# Patient Record
Sex: FEMALE | Race: BLACK OR AFRICAN AMERICAN | NOT HISPANIC OR LATINO | Employment: FULL TIME | URBAN - METROPOLITAN AREA
[De-identification: names, ages, dates, MRNs, and addresses within clinical notes are randomized per-mention and may not be internally consistent; named-entity substitution may affect disease eponyms.]

---

## 2024-03-17 ENCOUNTER — HOSPITAL ENCOUNTER (EMERGENCY)
Facility: CLINIC | Age: 24
Discharge: HOME | End: 2024-03-17
Attending: EMERGENCY MEDICINE | Admitting: EMERGENCY MEDICINE
Payer: MEDICAID

## 2024-03-17 VITALS
OXYGEN SATURATION: 98 % | RESPIRATION RATE: 18 BRPM | HEART RATE: 83 BPM | DIASTOLIC BLOOD PRESSURE: 80 MMHG | SYSTOLIC BLOOD PRESSURE: 128 MMHG | TEMPERATURE: 97.1 F

## 2024-03-17 DIAGNOSIS — O21.9 NAUSEA AND VOMITING IN PREGNANCY: ICD-10-CM

## 2024-03-17 LAB
ALBUMIN UR-MCNC: NEGATIVE MG/DL
ANION GAP SERPL CALCULATED.3IONS-SCNC: 15 MMOL/L (ref 7–15)
APPEARANCE UR: CLEAR
BACTERIA #/AREA URNS HPF: ABNORMAL /HPF
BASOPHILS # BLD AUTO: 0.1 10E3/UL (ref 0–0.2)
BASOPHILS NFR BLD AUTO: 1 %
BILIRUB UR QL STRIP: NEGATIVE
BUN SERPL-MCNC: 6.2 MG/DL (ref 6–20)
CALCIUM SERPL-MCNC: 8.9 MG/DL (ref 8.6–10)
CHLORIDE SERPL-SCNC: 100 MMOL/L (ref 98–107)
COLOR UR AUTO: ABNORMAL
CREAT SERPL-MCNC: 0.5 MG/DL (ref 0.51–0.95)
DEPRECATED HCO3 PLAS-SCNC: 18 MMOL/L (ref 22–29)
EGFRCR SERPLBLD CKD-EPI 2021: >90 ML/MIN/1.73M2
EOSINOPHIL # BLD AUTO: 0.1 10E3/UL (ref 0–0.7)
EOSINOPHIL NFR BLD AUTO: 1 %
ERYTHROCYTE [DISTWIDTH] IN BLOOD BY AUTOMATED COUNT: 14.1 % (ref 10–15)
GLUCOSE SERPL-MCNC: 86 MG/DL (ref 70–99)
GLUCOSE UR STRIP-MCNC: NEGATIVE MG/DL
HCG SERPL QL: POSITIVE
HCT VFR BLD AUTO: 39.6 % (ref 35–47)
HGB BLD-MCNC: 12.5 G/DL (ref 11.7–15.7)
HGB UR QL STRIP: NEGATIVE
IMM GRANULOCYTES # BLD: 0 10E3/UL
IMM GRANULOCYTES NFR BLD: 0 %
KETONES UR STRIP-MCNC: NEGATIVE MG/DL
LEUKOCYTE ESTERASE UR QL STRIP: NEGATIVE
LYMPHOCYTES # BLD AUTO: 3.9 10E3/UL (ref 0.8–5.3)
LYMPHOCYTES NFR BLD AUTO: 31 %
MCH RBC QN AUTO: 27.2 PG (ref 26.5–33)
MCHC RBC AUTO-ENTMCNC: 31.6 G/DL (ref 31.5–36.5)
MCV RBC AUTO: 86 FL (ref 78–100)
MONOCYTES # BLD AUTO: 1 10E3/UL (ref 0–1.3)
MONOCYTES NFR BLD AUTO: 8 %
NEUTROPHILS # BLD AUTO: 7.4 10E3/UL (ref 1.6–8.3)
NEUTROPHILS NFR BLD AUTO: 59 %
NITRATE UR QL: NEGATIVE
NRBC # BLD AUTO: 0 10E3/UL
NRBC BLD AUTO-RTO: 0 /100
PH UR STRIP: 5.5 [PH] (ref 5–7)
PLATELET # BLD AUTO: 365 10E3/UL (ref 150–450)
POTASSIUM SERPL-SCNC: 4.5 MMOL/L (ref 3.4–5.3)
RBC # BLD AUTO: 4.59 10E6/UL (ref 3.8–5.2)
RBC URINE: 0 /HPF
SODIUM SERPL-SCNC: 133 MMOL/L (ref 135–145)
SP GR UR STRIP: 1 (ref 1–1.03)
SQUAMOUS EPITHELIAL: <1 /HPF
UROBILINOGEN UR STRIP-MCNC: NORMAL MG/DL
WBC # BLD AUTO: 12.6 10E3/UL (ref 4–11)
WBC URINE: <1 /HPF

## 2024-03-17 PROCEDURE — 81001 URINALYSIS AUTO W/SCOPE: CPT | Performed by: EMERGENCY MEDICINE

## 2024-03-17 PROCEDURE — 84703 CHORIONIC GONADOTROPIN ASSAY: CPT | Performed by: EMERGENCY MEDICINE

## 2024-03-17 PROCEDURE — 85025 COMPLETE CBC W/AUTO DIFF WBC: CPT | Performed by: EMERGENCY MEDICINE

## 2024-03-17 PROCEDURE — 96374 THER/PROPH/DIAG INJ IV PUSH: CPT

## 2024-03-17 PROCEDURE — 258N000003 HC RX IP 258 OP 636: Performed by: EMERGENCY MEDICINE

## 2024-03-17 PROCEDURE — 99284 EMERGENCY DEPT VISIT MOD MDM: CPT | Mod: 25

## 2024-03-17 PROCEDURE — 96361 HYDRATE IV INFUSION ADD-ON: CPT

## 2024-03-17 PROCEDURE — 36415 COLL VENOUS BLD VENIPUNCTURE: CPT | Performed by: EMERGENCY MEDICINE

## 2024-03-17 PROCEDURE — 80048 BASIC METABOLIC PNL TOTAL CA: CPT | Performed by: EMERGENCY MEDICINE

## 2024-03-17 PROCEDURE — 250N000011 HC RX IP 250 OP 636: Performed by: EMERGENCY MEDICINE

## 2024-03-17 RX ORDER — ONDANSETRON 2 MG/ML
4 INJECTION INTRAMUSCULAR; INTRAVENOUS
Status: DISCONTINUED | OUTPATIENT
Start: 2024-03-17 | End: 2024-03-17 | Stop reason: HOSPADM

## 2024-03-17 RX ORDER — ONDANSETRON 4 MG/1
4 TABLET, ORALLY DISINTEGRATING ORAL EVERY 8 HOURS PRN
Qty: 10 TABLET | Refills: 0 | Status: SHIPPED | OUTPATIENT
Start: 2024-03-17 | End: 2024-03-20

## 2024-03-17 RX ADMIN — ONDANSETRON 4 MG: 2 INJECTION INTRAMUSCULAR; INTRAVENOUS at 01:29

## 2024-03-17 RX ADMIN — SODIUM CHLORIDE 1000 ML: 9 INJECTION, SOLUTION INTRAVENOUS at 00:57

## 2024-03-17 ASSESSMENT — ACTIVITIES OF DAILY LIVING (ADL)
ADLS_ACUITY_SCORE: 35
ADLS_ACUITY_SCORE: 35

## 2024-03-17 ASSESSMENT — COLUMBIA-SUICIDE SEVERITY RATING SCALE - C-SSRS
6. HAVE YOU EVER DONE ANYTHING, STARTED TO DO ANYTHING, OR PREPARED TO DO ANYTHING TO END YOUR LIFE?: NO
1. IN THE PAST MONTH, HAVE YOU WISHED YOU WERE DEAD OR WISHED YOU COULD GO TO SLEEP AND NOT WAKE UP?: NO
2. HAVE YOU ACTUALLY HAD ANY THOUGHTS OF KILLING YOURSELF IN THE PAST MONTH?: NO
2. HAVE YOU ACTUALLY HAD ANY THOUGHTS OF KILLING YOURSELF IN THE PAST MONTH?: NO
1. IN THE PAST MONTH, HAVE YOU WISHED YOU WERE DEAD OR WISHED YOU COULD GO TO SLEEP AND NOT WAKE UP?: NO
6. HAVE YOU EVER DONE ANYTHING, STARTED TO DO ANYTHING, OR PREPARED TO DO ANYTHING TO END YOUR LIFE?: NO

## 2024-03-17 NOTE — ED PROVIDER NOTES
History     Chief Complaint:  Vomiting        HPI   Kaden Ibarra is a 23 year old female approximately 5 weeks pregnant by LMP who presents with frequent vomiting.  The patient notes that she is vomiting approximately 2-3 times per day, including once prior to arrival.  She denies any abdominal pain, bleeding, discharge, or other concerns.  She would like her pregnancy status confirmed is also concerned regarding persistent vomiting.        Independent Historian:   none    Review of External Notes: Reviewed 10/9/2023 office visit and family medicine for comprehensive review of medical history    ROS:  Review of Systems    Allergies:  No Known Allergies     Physical Exam     Patient Vitals for the past 24 hrs:   BP Temp Temp src Pulse Resp SpO2   05/12/23 0247 113/73 97.8  F (36.6  C) Temporal 95 20 98 %        Physical Exam  Constitutional: Alert, attentive  HENT:    Nose: Nose normal.    Mouth/Throat: Oropharynx is clear, mucous membranes are moist   Eyes: EOM are normal.   CV: regular rate and rhythm; no murmurs, rubs or gallups  Chest: Effort normal and breath sounds normal.   GI:  There is no tenderness. No distension. Normal bowel sounds  MSK: Normal range of motion.   Neurological: Alert, attentive  Skin: Skin is warm and dry.      Emergency Department Course       Results for orders placed or performed during the hospital encounter of 03/17/24   Basic metabolic panel (BMP)     Status: Abnormal   Result Value Ref Range    Sodium 133 (L) 135 - 145 mmol/L    Potassium 4.5 3.4 - 5.3 mmol/L    Chloride 100 98 - 107 mmol/L    Carbon Dioxide (CO2) 18 (L) 22 - 29 mmol/L    Anion Gap 15 7 - 15 mmol/L    Urea Nitrogen 6.2 6.0 - 20.0 mg/dL    Creatinine 0.50 (L) 0.51 - 0.95 mg/dL    GFR Estimate >90 >60 mL/min/1.73m2    Calcium 8.9 8.6 - 10.0 mg/dL    Glucose 86 70 - 99 mg/dL   HCG QUALitative pregnancy (blood)     Status: Abnormal   Result Value Ref Range    hCG Serum Qualitative Positive (A) Negative   Extra  Tube ([facility] Draw)     Status: None ()    Narrative    The following orders were created for panel order Extra Tube ([facility] Draw).  Procedure                               Abnormality         Status                     ---------                               -----------         ------                     Extra Blue Top Tube[[id number]]                                                         Extra Red Top Tube[[id number]]                                                            Please view results for these tests on the individual orders.   CBC with platelets and differential     Status: Abnormal   Result Value Ref Range    WBC Count 12.6 (H) 4.0 - 11.0 10e3/uL    RBC Count 4.59 3.80 - 5.20 10e6/uL    Hemoglobin 12.5 11.7 - 15.7 g/dL    Hematocrit 39.6 35.0 - 47.0 %    MCV 86 78 - 100 fL    MCH 27.2 26.5 - 33.0 pg    MCHC 31.6 31.5 - 36.5 g/dL    RDW 14.1 10.0 - 15.0 %    Platelet Count 365 150 - 450 10e3/uL    % Neutrophils 59 %    % Lymphocytes 31 %    % Monocytes 8 %    % Eosinophils 1 %    % Basophils 1 %    % Immature Granulocytes 0 %    NRBCs per 100 WBC 0 <1 /100    Absolute Neutrophils 7.4 1.6 - 8.3 10e3/uL    Absolute Lymphocytes 3.9 0.8 - 5.3 10e3/uL    Absolute Monocytes 1.0 0.0 - 1.3 10e3/uL    Absolute Eosinophils 0.1 0.0 - 0.7 10e3/uL    Absolute Basophils 0.1 0.0 - 0.2 10e3/uL    Absolute Immature Granulocytes 0.0 <=0.4 10e3/uL    Absolute NRBCs 0.0 10e3/uL   UA with Microscopic reflex to Culture     Status: Abnormal    Specimen: Urine, Midstream   Result Value Ref Range    Color Urine Straw Colorless, Straw, Light Yellow, Yellow    Appearance Urine Clear Clear    Glucose Urine Negative Negative mg/dL    Bilirubin Urine Negative Negative    Ketones Urine Negative Negative mg/dL    Specific Gravity Urine 1.005 1.003 - 1.035    Blood Urine Negative Negative    pH Urine 5.5 5.0 - 7.0    Protein Albumin Urine Negative Negative mg/dL    Urobilinogen Urine Normal Normal, 2.0 mg/dL    Nitrite Urine Negative  Negative    Leukocyte Esterase Urine Negative Negative    Bacteria Urine Few (A) None Seen /HPF    RBC Urine 0 <=2 /HPF    WBC Urine <1 <=5 /HPF    Squamous Epithelials Urine <1 <=1 /HPF    Narrative    Urine Culture not indicated   CBC + differential     Status: Abnormal    Narrative    The following orders were created for panel order CBC + differential.  Procedure                               Abnormality         Status                     ---------                               -----------         ------                     CBC with platelets and d...[[id number]]  Abnormal            Final result                 Please view results for these tests on the individual orders.       Emergency Department Course & Assessments:             Interventions:  Medications   ondansetron (ZOFRAN) injection 4 mg (4 mg Intravenous $Given 3/17/24 0129)   sodium chloride 0.9% BOLUS 1,000 mL (0 mLs Intravenous Stopped 3/17/24 0221)          Disposition:  The patient was discharged to home.     Impression & Plan        Medical Decision Making:  This is a 23-year-old female who is approximately 5 weeks pregnant by LMP who presents for evaluation of frequent vomiting over the last several weeks.  She is indeed pregnant.  She is not having abdominal pain, bleeding, or discharge to suggest complication of pregnancy or indicated ultrasound at this time.  Vitals are normal and stable.  She is feeling significantly improved after supportive cares and labs are generally reassuring.  She is tolerating p.o.'s without difficulty.  Plan for Zofran prescription for home and establish OB/GYN care.  Return precautions for intractable vomiting, pain, fever, or any other concerns.    Diagnosis:  Visit Diagnosis, Associated Orders, and Comments     ICD-10-CM    1. Nausea and vomiting in pregnancy  O21.9                Mckinley Andino MD  03/17/24 0313

## 2024-10-06 ENCOUNTER — APPOINTMENT (OUTPATIENT)
Dept: ULTRASOUND IMAGING | Facility: CLINIC | Age: 24
End: 2024-10-06
Attending: FAMILY MEDICINE
Payer: COMMERCIAL

## 2024-10-06 ENCOUNTER — HOSPITAL ENCOUNTER (OUTPATIENT)
Facility: CLINIC | Age: 24
End: 2024-10-06
Admitting: FAMILY MEDICINE
Payer: COMMERCIAL

## 2024-10-06 ENCOUNTER — HOSPITAL ENCOUNTER (OUTPATIENT)
Facility: CLINIC | Age: 24
Discharge: HOME | End: 2024-10-06
Attending: OBSTETRICS & GYNECOLOGY | Admitting: FAMILY MEDICINE
Payer: COMMERCIAL

## 2024-10-06 VITALS
SYSTOLIC BLOOD PRESSURE: 108 MMHG | BODY MASS INDEX: 26.82 KG/M2 | HEIGHT: 65 IN | RESPIRATION RATE: 16 BRPM | WEIGHT: 161 LBS | TEMPERATURE: 97.7 F | DIASTOLIC BLOOD PRESSURE: 69 MMHG

## 2024-10-06 PROBLEM — Z36.89 ENCOUNTER FOR TRIAGE IN PREGNANT PATIENT: Status: ACTIVE | Noted: 2024-10-06

## 2024-10-06 PROCEDURE — 59025 FETAL NON-STRESS TEST: CPT

## 2024-10-06 PROCEDURE — G0463 HOSPITAL OUTPT CLINIC VISIT: HCPCS

## 2024-10-06 PROCEDURE — 76819 FETAL BIOPHYS PROFIL W/O NST: CPT

## 2024-10-06 PROCEDURE — 59025 FETAL NON-STRESS TEST: CPT | Mod: 26 | Performed by: OBSTETRICS & GYNECOLOGY

## 2024-10-06 RX ORDER — LIDOCAINE 40 MG/G
CREAM TOPICAL
Status: DISCONTINUED | OUTPATIENT
Start: 2024-10-06 | End: 2024-10-06 | Stop reason: HOSPADM

## 2024-10-06 RX ORDER — VITAMIN A, VITAMIN C, VITAMIN D-3, VITAMIN E, VITAMIN B-1, VITAMIN B-2, NIACIN, VITAMIN B-6, CALCIUM, IRON, ZINC, COPPER 4000; 120; 400; 22; 1.84; 3; 20; 10; 1; 12; 200; 27; 25; 2 [IU]/1; MG/1; [IU]/1; MG/1; MG/1; MG/1; MG/1; MG/1; MG/1; UG/1; MG/1; MG/1; MG/1; MG/1
1 TABLET ORAL DAILY
COMMUNITY

## 2024-10-06 ASSESSMENT — ACTIVITIES OF DAILY LIVING (ADL)
ADLS_ACUITY_SCORE: 20
ADLS_ACUITY_SCORE: 35

## 2024-10-07 NOTE — DISCHARGE INSTRUCTIONS
Your US report shows a BPP 8/8 and your fetal heart tracing was reactive.   Learning About When to Call Your Doctor During Pregnancy (After 20 Weeks)  Overview  It's common to have concerns about what might be a problem when you're pregnant. Most pregnancies don't have any serious problems. But it's still important to know when to call your doctor if you have certain symptoms or signs of labor.  These are general suggestions. Your doctor may give you some more information about when to call.  When to call your doctor (after 20 weeks)  Call 911  anytime you think you may need emergency care. For example, call if:  You have severe vaginal bleeding. This means you are soaking through a pad each hour for 2 or more hours.  You have sudden, severe pain in your belly.  You have chest pain, are short of breath, or cough up blood.  You passed out (lost consciousness).  You have a seizure.  You see or feel the umbilical cord.  You think you are about to deliver your baby and can't make it safely to the hospital or birthing center.  Call your doctor now or seek immediate medical care if:  You have vaginal bleeding.  You have belly pain.  You have a fever.  You are dizzy or lightheaded, or you feel like you may faint.  You have signs of a blood clot in your leg (called a deep vein thrombosis), such as:  Pain in the calf, back of the knee, thigh, or groin.  Swelling in your leg or groin.  A color change on the leg or groin. The skin may be reddish or purplish, depending on your usual skin color.  You have symptoms of preeclampsia, such as:  Sudden swelling of your face, hands, or feet.  New vision problems (such as dimness, blurring, or seeing spots).  A severe headache.  You have a sudden release of fluid from your vagina. (You think your water broke.)  You've been having regular contractions for an hour. This means that you've had at least 6 contractions within 1 hour, even after you change your position and drink fluids.  You  "notice that your baby has stopped moving or is moving less than normal.  You have signs of heart failure, such as:  New or increased shortness of breath.  New or worse swelling in your legs, ankles, or feet.  Sudden weight gain, such as more than 2 to 3 pounds in a day or 5 pounds in a week.  Feeling so tired or weak that you cannot do your usual activities.  You have symptoms of a urinary tract infection. These may include:  Pain or burning when you urinate.  A frequent need to urinate without being able to pass much urine.  Pain in the flank, which is just below the rib cage and above the waist on either side of the back.  Blood in your urine.  Watch closely for changes in your health, and be sure to contact your doctor if:  You have vaginal discharge that smells bad.  You feel sad, anxious, or hopeless for more than a few days.  You have skin changes, such as a rash, itching, or a yellow color to your skin.  You have other concerns about your pregnancy.  If you have labor signs at 37 weeks or more  If you have signs of labor at 37 weeks or more, your doctor may tell you to call when your labor becomes more active. Symptoms of active labor include:  Contractions that are regular.  Contractions that are less than 5 minutes apart.  Contractions that are hard to talk through.  Follow-up care is a key part of your treatment and safety. Be sure to make and go to all appointments, and call your doctor if you are having problems. It's also a good idea to know your test results and keep a list of the medicines you take.  Where can you learn more?  Go to https://www.[employer].net/patiented  Enter N531 in the search box to learn more about \"Learning About When to Call Your Doctor During Pregnancy (After 20 Weeks).\"  Current as of: July 10, 2023  Content Version: 14.2 2024 [employer] [facility] [employer].   Care instructions adapted under license by your healthcare professional. If you have questions about a medical condition or " this instruction, always ask your healthcare professional. [employer] disclaims any warranty or liability for your use of this information.

## 2024-10-07 NOTE — PROVIDER NOTIFICATION
Communication with Dr. Montero via phone regarding DFM for doctor of the week provider group as patient is currently an established [facility] patient. Reported patient is a 24 yo,  at 34 weeks and 2 days GA with decreased fetal movement that began yesterday with minimal kick counts and continued today. Pregnancy complicated by anemia, low weight gain, heartburn and cleft lip. She is c/o mild dizziness, similar with her baseline, had a light meal earlier. On FHT which is reactive. [place] tracing occasional uterine irritability, patient denies.     TORB for NST, BPP, PO hydrate.

## 2024-10-07 NOTE — PLAN OF CARE
Patient's After Visit Summary was reviewed with patient.  Patient verbalized understanding of After Visit Summary, recommended follow up and was given an opportunity to ask questions.   Discharge medications sent home with patient/family: Not applicable   Discharged home independently.

## 2024-10-07 NOTE — PLAN OF CARE
Data: [facility] presented [facility]: 10/6/2024  6:49 PM.  Reason for maternal/fetal assessment is decreased fetal movement. Patient reports less fetal movement that started yesterday for long periods of time.  Patient is a .  Prenatal record reviewed. Pregnancy has been uncomplicated..  Gestational Age 34w2d. VSS. Fetal movement decreased since 10/05/24. Patient denies uterine contractions, leaking of vaginal fluid/rupture of membranes, vaginal bleeding, abdominal pain, pelvic pressure, nausea, vomiting, headache, visual disturbances, epigastric or URQ pain, significant edema. Support person is not present.   Action: Verbal consent for EFM. Triage assessment completed. Bill of rights reviewed.  Response: Patient verbalized agreement with plan. Will contact Dr Brianna Hyman with update and for further orders.

## 2024-10-07 NOTE — PROVIDER NOTIFICATION
Communication with Dr. Montero via vocera messaging. Reviewed US tech report on BPP and reactive NST. TORB to discharge home.

## 2024-10-29 ENCOUNTER — HOSPITAL ENCOUNTER (OUTPATIENT)
Facility: CLINIC | Age: 24
Discharge: HOME | End: 2024-10-29
Attending: OBSTETRICS & GYNECOLOGY | Admitting: OBSTETRICS & GYNECOLOGY
Payer: COMMERCIAL

## 2024-10-29 VITALS — RESPIRATION RATE: 18 BRPM | SYSTOLIC BLOOD PRESSURE: 114 MMHG | DIASTOLIC BLOOD PRESSURE: 74 MMHG | TEMPERATURE: 97.7 F

## 2024-10-29 PROCEDURE — G0463 HOSPITAL OUTPT CLINIC VISIT: HCPCS

## 2024-10-29 ASSESSMENT — ACTIVITIES OF DAILY LIVING (ADL)
ADLS_ACUITY_SCORE: 0

## 2024-10-29 NOTE — CARE PLAN
Data: Patient presented to [facility]: 10/29/2024  5:32 PM.  Reason for maternal/fetal assessment is uterine contractions, abdominal pain. Patient reports abdominal pain in her entire abdomen that radiates intermittently to her back. Pt also states she feels pelvic pressure. She is rating the pain 6/10 when it comes. Patient is a .  Prenatal record reviewed. Pregnancy has been uncomplicated..  Gestational Age 37w4d. VSS. Fetal movement active. Patient denies leaking of vaginal fluid/rupture of membranes, vaginal bleeding, nausea, vomiting, headache, visual disturbances, epigastric or URQ pain, significant edema. Support person is not present.   Action: Verbal consent for EFM. Triage assessment completed. Bill of rights reviewed.  Response: Patient verbalized agreement with plan. Will contact Dr Mariama Abebe with update and for further orders.

## 2024-10-29 NOTE — PROVIDER NOTIFICATION
10/29/24 1800   Provider Notification   Provider Name/Title Dr. Issa   Method of Notification In Department   Notification Reason Patient Arrived     Updated Dr. Issa in department on arrival of pt  at 37w4d here with c/o intermittent abdominal pain that radiates to her back bilaterally. Pt states she feels the pain every 2 minutes or so, rating a 6/10. Fetus active per pt. Denies vaginal bldg, LOF, or any other symptoms at this time.    On monitor, FHT's cat I, reactive with moderate variability and accelerations present. On [place], ctx's q2-5 minutes, palpating mild.     MD orders to check pt's cervix and update. No need to send UA at this time. POC reviewed with pt--all questions answered. Education, emotional support, and anticipatory guidance provided.

## 2024-10-29 NOTE — PROVIDER NOTIFICATION
10/29/24 1830   Provider Notification   Provider Name/Title Dr. Issa   Method of Notification In Department     Updated Dr. Issa on SVE 1/70/-3, mid and soft. Feels cephalic on exam. Ctx q2-5.5 minutes, continuing to palpate mild at this time.    Plan to recheck SVE in 1.5-2 hours. Pt updated and agreeable with plan going forward. Questions answered.

## 2024-10-30 NOTE — PLAN OF CARE
Data: Patient assessed in the [facility] for uterine contractions.  Cervical exam 1/70/-3.  Membranes intact.  Contractions occ.  Action:  Presumed adequate fetal oxygenation documented (see flow record). Discharge instructions reviewed.  Patient instructed to report change in fetal movement, vaginal leaking of fluid or bleeding, abdominal pain, or any concerns related to the pregnancy to her nurse/physician.    Response: Orders to discharge home per Mariama Abebe.  Patient verbalized understanding of education and verbalized agreement with plan. Discharged to home at 2002.

## 2024-10-30 NOTE — PROVIDER NOTIFICATION
10/29/24 1943   Provider Notification   Provider Name/Title Dr. Issa   Method of Notification Electronic Page     Kaden is unchanged at 1/70/-3. Since 7pm, x2 contractions noted on toco but patient reporting she is feeling them the same as when she got here. FHR moderate with accels an no decels.     Orders to discharge home with labor precautions and to follow up with her scheduled appointment on Friday.

## 2024-10-30 NOTE — DISCHARGE INSTRUCTIONS
Learning About When to Call Your Doctor During Pregnancy (After 20 Weeks)  Overview  It's common to have concerns about what might be a problem when you're pregnant. Most pregnancies don't have any serious problems. But it's still important to know when to call your doctor if you have certain symptoms or signs of labor.  These are general suggestions. Your doctor may give you some more information about when to call.  When to call your doctor (after 20 weeks)  Call 911  anytime you think you may need emergency care. For example, call if:  You have severe vaginal bleeding. This means you are soaking through a pad each hour for 2 or more hours.  You have sudden, severe pain in your belly.  You have chest pain, are short of breath, or cough up blood.  You passed out (lost consciousness).  You have a seizure.  You see or feel the umbilical cord.  You think you are about to deliver your baby and can't make it safely to the hospital or birthing center.  Call your doctor now or seek immediate medical care if:  You have vaginal bleeding.  You have belly pain.  You have a fever.  You are dizzy or lightheaded, or you feel like you may faint.  You have signs of a blood clot in your leg (called a deep vein thrombosis), such as:  Pain in the calf, back of the knee, thigh, or groin.  Swelling in your leg or groin.  A color change on the leg or groin. The skin may be reddish or purplish, depending on your usual skin color.  You have symptoms of preeclampsia, such as:  Sudden swelling of your face, hands, or feet.  New vision problems (such as dimness, blurring, or seeing spots).  A severe headache.  You have a sudden release of fluid from your vagina. (You think your water broke.)  You've been having regular contractions for an hour. This means that you've had at least 6 contractions within 1 hour, even after you change your position and drink fluids.  You notice that your baby has stopped moving or is moving less than  "normal.  You have signs of heart failure, such as:  New or increased shortness of breath.  New or worse swelling in your legs, ankles, or feet.  Sudden weight gain, such as more than 2 to 3 pounds in a day or 5 pounds in a week.  Feeling so tired or weak that you cannot do your usual activities.  You have symptoms of a urinary tract infection. These may include:  Pain or burning when you urinate.  A frequent need to urinate without being able to pass much urine.  Pain in the flank, which is just below the rib cage and above the waist on either side of the back.  Blood in your urine.  Watch closely for changes in your health, and be sure to contact your doctor if:  You have vaginal discharge that smells bad.  You feel sad, anxious, or hopeless for more than a few days.  You have skin changes, such as a rash, itching, or a yellow color to your skin.  You have other concerns about your pregnancy.  If you have labor signs at 37 weeks or more  If you have signs of labor at 37 weeks or more, your doctor may tell you to call when your labor becomes more active. Symptoms of active labor include:  Contractions that are regular.  Contractions that are less than 5 minutes apart.  Contractions that are hard to talk through.  Follow-up care is a key part of your treatment and safety. Be sure to make and go to all appointments, and call your doctor if you are having problems. It's also a good idea to know your test results and keep a list of the medicines you take.  Where can you learn more?  Go to https://www.[email or web address].net/patiented  Enter N531 in the search box to learn more about \"Learning About When to Call Your Doctor During Pregnancy (After 20 Weeks).\"  Current as of: July 10, 2023  Content Version: 14.2 2024 [facility] [employer].   Care instructions adapted under license by your healthcare professional. If you have questions about a medical condition or this instruction, always ask your healthcare professional. " [employer] disclaims any warranty or liability for your use of this information.

## 2024-11-11 ENCOUNTER — ANESTHESIA (OUTPATIENT)
Dept: OBGYN | Facility: CLINIC | Age: 24
End: 2024-11-11
Payer: COMMERCIAL

## 2024-11-11 ENCOUNTER — ANESTHESIA EVENT (OUTPATIENT)
Dept: OBGYN | Facility: CLINIC | Age: 24
End: 2024-11-11
Payer: COMMERCIAL

## 2024-11-11 ENCOUNTER — HOSPITAL ENCOUNTER (INPATIENT)
Facility: CLINIC | Age: 24
LOS: 2 days | Discharge: HOME | End: 2024-11-13
Attending: OBSTETRICS & GYNECOLOGY | Admitting: OBSTETRICS & GYNECOLOGY
Payer: COMMERCIAL

## 2024-11-11 LAB
ABO/RH(D): NORMAL
ANTIBODY SCREEN: NEGATIVE
HGB BLD-MCNC: 14.3 G/DL (ref 11.7–15.7)
SPECIMEN EXPIRATION DATE: NORMAL
T PALLIDUM AB SER QL: NONREACTIVE

## 2024-11-11 PROCEDURE — 120N000013 HC R&B IMCU

## 2024-11-11 PROCEDURE — 250N000009 HC RX 250: Performed by: OBSTETRICS & GYNECOLOGY

## 2024-11-11 PROCEDURE — 00HU33Z INSERTION OF INFUSION DEVICE INTO SPINAL CANAL, PERCUTANEOUS APPROACH: ICD-10-PCS | Performed by: ANESTHESIOLOGY

## 2024-11-11 PROCEDURE — 250N000011 HC RX IP 250 OP 636: Performed by: OBSTETRICS & GYNECOLOGY

## 2024-11-11 PROCEDURE — 0KQM0ZZ REPAIR PERINEUM MUSCLE, OPEN APPROACH: ICD-10-PCS | Performed by: OBSTETRICS & GYNECOLOGY

## 2024-11-11 PROCEDURE — 86780 TREPONEMA PALLIDUM: CPT | Performed by: OBSTETRICS & GYNECOLOGY

## 2024-11-11 PROCEDURE — 86900 BLOOD TYPING SEROLOGIC ABO: CPT | Performed by: OBSTETRICS & GYNECOLOGY

## 2024-11-11 PROCEDURE — 10907ZC DRAINAGE OF AMNIOTIC FLUID, THERAPEUTIC FROM PRODUCTS OF CONCEPTION, VIA NATURAL OR ARTIFICIAL OPENING: ICD-10-PCS | Performed by: OBSTETRICS & GYNECOLOGY

## 2024-11-11 PROCEDURE — 3E0R3BZ INTRODUCTION OF ANESTHETIC AGENT INTO SPINAL CANAL, PERCUTANEOUS APPROACH: ICD-10-PCS | Performed by: ANESTHESIOLOGY

## 2024-11-11 PROCEDURE — 250N000013 HC RX MED GY IP 250 OP 250 PS 637: Performed by: OBSTETRICS & GYNECOLOGY

## 2024-11-11 PROCEDURE — 722N000001 HC LABOR CARE VAGINAL DELIVERY SINGLE

## 2024-11-11 PROCEDURE — 85018 HEMOGLOBIN: CPT | Performed by: OBSTETRICS & GYNECOLOGY

## 2024-11-11 PROCEDURE — 258N000003 HC RX IP 258 OP 636: Performed by: ANESTHESIOLOGY

## 2024-11-11 PROCEDURE — 86850 RBC ANTIBODY SCREEN: CPT | Performed by: OBSTETRICS & GYNECOLOGY

## 2024-11-11 PROCEDURE — 370N000003 HC ANESTHESIA WARD SERVICE: Performed by: ANESTHESIOLOGY

## 2024-11-11 PROCEDURE — 258N000003 HC RX IP 258 OP 636: Performed by: OBSTETRICS & GYNECOLOGY

## 2024-11-11 RX ORDER — OXYTOCIN 10 [USP'U]/ML
10 INJECTION, SOLUTION INTRAMUSCULAR; INTRAVENOUS
Status: DISCONTINUED | OUTPATIENT
Start: 2024-11-11 | End: 2024-11-13 | Stop reason: HOSPADM

## 2024-11-11 RX ORDER — NALOXONE HYDROCHLORIDE 0.4 MG/ML
0.4 INJECTION, SOLUTION INTRAMUSCULAR; INTRAVENOUS; SUBCUTANEOUS
Status: DISCONTINUED | OUTPATIENT
Start: 2024-11-11 | End: 2024-11-11 | Stop reason: HOSPADM

## 2024-11-11 RX ORDER — METOCLOPRAMIDE HYDROCHLORIDE 5 MG/ML
10 INJECTION INTRAMUSCULAR; INTRAVENOUS EVERY 6 HOURS PRN
Status: DISCONTINUED | OUTPATIENT
Start: 2024-11-11 | End: 2024-11-11 | Stop reason: HOSPADM

## 2024-11-11 RX ORDER — SODIUM CHLORIDE, SODIUM LACTATE, POTASSIUM CHLORIDE, CALCIUM CHLORIDE 600; 310; 30; 20 MG/100ML; MG/100ML; MG/100ML; MG/100ML
INJECTION, SOLUTION INTRAVENOUS CONTINUOUS
Status: DISCONTINUED | OUTPATIENT
Start: 2024-11-11 | End: 2024-11-11 | Stop reason: HOSPADM

## 2024-11-11 RX ORDER — FENTANYL CITRATE-0.9 % NACL/PF 10 MCG/ML
100 PLASTIC BAG, INJECTION (ML) INTRAVENOUS EVERY 5 MIN PRN
Status: DISCONTINUED | OUTPATIENT
Start: 2024-11-11 | End: 2024-11-11 | Stop reason: HOSPADM

## 2024-11-11 RX ORDER — ONDANSETRON 2 MG/ML
4 INJECTION INTRAMUSCULAR; INTRAVENOUS EVERY 6 HOURS PRN
Status: DISCONTINUED | OUTPATIENT
Start: 2024-11-11 | End: 2024-11-11 | Stop reason: HOSPADM

## 2024-11-11 RX ORDER — PENICILLIN G 3000000 [IU]/50ML
3 INJECTION, SOLUTION INTRAVENOUS EVERY 4 HOURS
Status: DISCONTINUED | OUTPATIENT
Start: 2024-11-11 | End: 2024-11-11 | Stop reason: HOSPADM

## 2024-11-11 RX ORDER — MISOPROSTOL 200 UG/1
800 TABLET ORAL
Status: DISCONTINUED | OUTPATIENT
Start: 2024-11-11 | End: 2024-11-13 | Stop reason: HOSPADM

## 2024-11-11 RX ORDER — ACETAMINOPHEN 325 MG/1
650 TABLET ORAL EVERY 4 HOURS PRN
Status: DISCONTINUED | OUTPATIENT
Start: 2024-11-11 | End: 2024-11-13 | Stop reason: HOSPADM

## 2024-11-11 RX ORDER — BISACODYL 10 MG
10 SUPPOSITORY, RECTAL RECTAL DAILY PRN
Status: DISCONTINUED | OUTPATIENT
Start: 2024-11-11 | End: 2024-11-13 | Stop reason: HOSPADM

## 2024-11-11 RX ORDER — MISOPROSTOL 200 UG/1
400 TABLET ORAL
Status: DISCONTINUED | OUTPATIENT
Start: 2024-11-11 | End: 2024-11-11 | Stop reason: HOSPADM

## 2024-11-11 RX ORDER — PROCHLORPERAZINE MALEATE 10 MG
10 TABLET ORAL EVERY 6 HOURS PRN
Status: DISCONTINUED | OUTPATIENT
Start: 2024-11-11 | End: 2024-11-11 | Stop reason: HOSPADM

## 2024-11-11 RX ORDER — PENICILLIN G POTASSIUM 5000000 [IU]/1
5 INJECTION, POWDER, FOR SOLUTION INTRAMUSCULAR; INTRAVENOUS ONCE
Status: COMPLETED | OUTPATIENT
Start: 2024-11-11 | End: 2024-11-11

## 2024-11-11 RX ORDER — NALOXONE HYDROCHLORIDE 0.4 MG/ML
0.2 INJECTION, SOLUTION INTRAMUSCULAR; INTRAVENOUS; SUBCUTANEOUS
Status: DISCONTINUED | OUTPATIENT
Start: 2024-11-11 | End: 2024-11-11 | Stop reason: HOSPADM

## 2024-11-11 RX ORDER — TRANEXAMIC ACID 10 MG/ML
1 INJECTION, SOLUTION INTRAVENOUS EVERY 30 MIN PRN
Status: DISCONTINUED | OUTPATIENT
Start: 2024-11-11 | End: 2024-11-11 | Stop reason: HOSPADM

## 2024-11-11 RX ORDER — OXYTOCIN/0.9 % SODIUM CHLORIDE 30/500 ML
340 PLASTIC BAG, INJECTION (ML) INTRAVENOUS CONTINUOUS PRN
Status: DISCONTINUED | OUTPATIENT
Start: 2024-11-11 | End: 2024-11-13 | Stop reason: HOSPADM

## 2024-11-11 RX ORDER — CITRIC ACID/SODIUM CITRATE 334-500MG
30 SOLUTION, ORAL ORAL
Status: DISCONTINUED | OUTPATIENT
Start: 2024-11-11 | End: 2024-11-11 | Stop reason: HOSPADM

## 2024-11-11 RX ORDER — LOPERAMIDE HYDROCHLORIDE 2 MG/1
2 CAPSULE ORAL
Status: DISCONTINUED | OUTPATIENT
Start: 2024-11-11 | End: 2024-11-13 | Stop reason: HOSPADM

## 2024-11-11 RX ORDER — FENTANYL CITRATE 50 UG/ML
100 INJECTION, SOLUTION INTRAMUSCULAR; INTRAVENOUS
Status: DISCONTINUED | OUTPATIENT
Start: 2024-11-11 | End: 2024-11-11 | Stop reason: HOSPADM

## 2024-11-11 RX ORDER — MISOPROSTOL 200 UG/1
400 TABLET ORAL
Status: DISCONTINUED | OUTPATIENT
Start: 2024-11-11 | End: 2024-11-13 | Stop reason: HOSPADM

## 2024-11-11 RX ORDER — OXYTOCIN/0.9 % SODIUM CHLORIDE 30/500 ML
340 PLASTIC BAG, INJECTION (ML) INTRAVENOUS CONTINUOUS PRN
Status: DISCONTINUED | OUTPATIENT
Start: 2024-11-11 | End: 2024-11-11 | Stop reason: HOSPADM

## 2024-11-11 RX ORDER — CALCIUM CARBONATE 500 MG/1
1000 TABLET, CHEWABLE ORAL 2 TIMES DAILY PRN
Status: DISCONTINUED | OUTPATIENT
Start: 2024-11-11 | End: 2024-11-12

## 2024-11-11 RX ORDER — METHYLERGONOVINE MALEATE 0.2 MG/ML
200 INJECTION INTRAVENOUS
Status: DISCONTINUED | OUTPATIENT
Start: 2024-11-11 | End: 2024-11-11 | Stop reason: HOSPADM

## 2024-11-11 RX ORDER — OXYTOCIN/0.9 % SODIUM CHLORIDE 30/500 ML
100-340 PLASTIC BAG, INJECTION (ML) INTRAVENOUS CONTINUOUS PRN
Status: DISCONTINUED | OUTPATIENT
Start: 2024-11-11 | End: 2024-11-12

## 2024-11-11 RX ORDER — OXYTOCIN 10 [USP'U]/ML
10 INJECTION, SOLUTION INTRAMUSCULAR; INTRAVENOUS
Status: DISCONTINUED | OUTPATIENT
Start: 2024-11-11 | End: 2024-11-12

## 2024-11-11 RX ORDER — LOPERAMIDE HYDROCHLORIDE 2 MG/1
4 CAPSULE ORAL
Status: DISCONTINUED | OUTPATIENT
Start: 2024-11-11 | End: 2024-11-11 | Stop reason: HOSPADM

## 2024-11-11 RX ORDER — FENTANYL/BUPIVACAINE/NS/PF 2-1250MCG
PLASTIC BAG, INJECTION (ML) INJECTION
Status: COMPLETED
Start: 2024-11-11 | End: 2024-11-11

## 2024-11-11 RX ORDER — KETOROLAC TROMETHAMINE 30 MG/ML
30 INJECTION, SOLUTION INTRAMUSCULAR; INTRAVENOUS
Status: COMPLETED | OUTPATIENT
Start: 2024-11-11 | End: 2024-11-11

## 2024-11-11 RX ORDER — IBUPROFEN 800 MG/1
800 TABLET, FILM COATED ORAL EVERY 6 HOURS PRN
Status: DISCONTINUED | OUTPATIENT
Start: 2024-11-11 | End: 2024-11-13 | Stop reason: HOSPADM

## 2024-11-11 RX ORDER — OXYTOCIN 10 [USP'U]/ML
10 INJECTION, SOLUTION INTRAMUSCULAR; INTRAVENOUS
Status: DISCONTINUED | OUTPATIENT
Start: 2024-11-11 | End: 2024-11-11 | Stop reason: HOSPADM

## 2024-11-11 RX ORDER — CARBOPROST TROMETHAMINE 250 UG/ML
250 INJECTION, SOLUTION INTRAMUSCULAR
Status: DISCONTINUED | OUTPATIENT
Start: 2024-11-11 | End: 2024-11-13 | Stop reason: HOSPADM

## 2024-11-11 RX ORDER — IBUPROFEN 800 MG/1
800 TABLET, FILM COATED ORAL
Status: COMPLETED | OUTPATIENT
Start: 2024-11-11 | End: 2024-11-11

## 2024-11-11 RX ORDER — LOPERAMIDE HYDROCHLORIDE 2 MG/1
2 CAPSULE ORAL
Status: DISCONTINUED | OUTPATIENT
Start: 2024-11-11 | End: 2024-11-11 | Stop reason: HOSPADM

## 2024-11-11 RX ORDER — METHYLERGONOVINE MALEATE 0.2 MG/ML
200 INJECTION INTRAVENOUS
Status: DISCONTINUED | OUTPATIENT
Start: 2024-11-11 | End: 2024-11-13 | Stop reason: HOSPADM

## 2024-11-11 RX ORDER — HYDROCORTISONE 25 MG/G
CREAM TOPICAL 3 TIMES DAILY PRN
Status: DISCONTINUED | OUTPATIENT
Start: 2024-11-11 | End: 2024-11-13 | Stop reason: HOSPADM

## 2024-11-11 RX ORDER — MISOPROSTOL 200 UG/1
800 TABLET ORAL
Status: DISCONTINUED | OUTPATIENT
Start: 2024-11-11 | End: 2024-11-11 | Stop reason: HOSPADM

## 2024-11-11 RX ORDER — LOPERAMIDE HYDROCHLORIDE 2 MG/1
4 CAPSULE ORAL
Status: DISCONTINUED | OUTPATIENT
Start: 2024-11-11 | End: 2024-11-13 | Stop reason: HOSPADM

## 2024-11-11 RX ORDER — FENTANYL CITRATE 50 UG/ML
100 INJECTION, SOLUTION INTRAMUSCULAR; INTRAVENOUS ONCE
Status: COMPLETED | OUTPATIENT
Start: 2024-11-11 | End: 2024-11-11

## 2024-11-11 RX ORDER — CARBOPROST TROMETHAMINE 250 UG/ML
250 INJECTION, SOLUTION INTRAMUSCULAR
Status: DISCONTINUED | OUTPATIENT
Start: 2024-11-11 | End: 2024-11-11 | Stop reason: HOSPADM

## 2024-11-11 RX ORDER — NALBUPHINE HYDROCHLORIDE 10 MG/ML
2.5-5 INJECTION INTRAMUSCULAR; INTRAVENOUS; SUBCUTANEOUS EVERY 6 HOURS PRN
Status: DISCONTINUED | OUTPATIENT
Start: 2024-11-11 | End: 2024-11-12

## 2024-11-11 RX ORDER — ONDANSETRON 4 MG/1
4 TABLET, ORALLY DISINTEGRATING ORAL EVERY 6 HOURS PRN
Status: DISCONTINUED | OUTPATIENT
Start: 2024-11-11 | End: 2024-11-11 | Stop reason: HOSPADM

## 2024-11-11 RX ORDER — METOCLOPRAMIDE 10 MG/1
10 TABLET ORAL EVERY 6 HOURS PRN
Status: DISCONTINUED | OUTPATIENT
Start: 2024-11-11 | End: 2024-11-11 | Stop reason: HOSPADM

## 2024-11-11 RX ORDER — SODIUM CHLORIDE, SODIUM LACTATE, POTASSIUM CHLORIDE, CALCIUM CHLORIDE 600; 310; 30; 20 MG/100ML; MG/100ML; MG/100ML; MG/100ML
INJECTION, SOLUTION INTRAVENOUS CONTINUOUS
Status: DISCONTINUED | OUTPATIENT
Start: 2024-11-11 | End: 2024-11-12

## 2024-11-11 RX ORDER — TRANEXAMIC ACID 10 MG/ML
1 INJECTION, SOLUTION INTRAVENOUS EVERY 30 MIN PRN
Status: DISCONTINUED | OUTPATIENT
Start: 2024-11-11 | End: 2024-11-13 | Stop reason: HOSPADM

## 2024-11-11 RX ORDER — MODIFIED LANOLIN
OINTMENT (GRAM) TOPICAL
Status: DISCONTINUED | OUTPATIENT
Start: 2024-11-11 | End: 2024-11-13 | Stop reason: HOSPADM

## 2024-11-11 RX ORDER — DOCUSATE SODIUM 100 MG/1
100 CAPSULE, LIQUID FILLED ORAL DAILY
Status: DISCONTINUED | OUTPATIENT
Start: 2024-11-12 | End: 2024-11-13 | Stop reason: HOSPADM

## 2024-11-11 RX ADMIN — FENTANYL CITRATE 100 MCG: 50 INJECTION, SOLUTION INTRAMUSCULAR; INTRAVENOUS at 16:51

## 2024-11-11 RX ADMIN — Medication 340 ML/HR: at 20:49

## 2024-11-11 RX ADMIN — SODIUM CHLORIDE, POTASSIUM CHLORIDE, SODIUM LACTATE AND CALCIUM CHLORIDE 500 ML: 600; 310; 30; 20 INJECTION, SOLUTION INTRAVENOUS at 13:50

## 2024-11-11 RX ADMIN — Medication: at 15:56

## 2024-11-11 RX ADMIN — ACETAMINOPHEN 325MG 650 MG: 325 TABLET ORAL at 22:19

## 2024-11-11 RX ADMIN — PENICILLIN G POTASSIUM 5 MILLION UNITS: 5000000 POWDER, FOR SOLUTION INTRAMUSCULAR; INTRAPLEURAL; INTRATHECAL; INTRAVENOUS at 14:28

## 2024-11-11 RX ADMIN — PENICILLIN G 3 MILLION UNITS: 3000000 INJECTION, SOLUTION INTRAVENOUS at 18:35

## 2024-11-11 RX ADMIN — MISOPROSTOL 800 MCG: 200 TABLET ORAL at 21:10

## 2024-11-11 RX ADMIN — SODIUM CHLORIDE, POTASSIUM CHLORIDE, SODIUM LACTATE AND CALCIUM CHLORIDE 500 ML: 600; 310; 30; 20 INJECTION, SOLUTION INTRAVENOUS at 15:17

## 2024-11-11 RX ADMIN — KETOROLAC TROMETHAMINE 30 MG: 30 INJECTION, SOLUTION INTRAMUSCULAR at 20:51

## 2024-11-11 RX ADMIN — FENTANYL CITRATE 100 MCG: 50 INJECTION, SOLUTION INTRAMUSCULAR; INTRAVENOUS at 13:53

## 2024-11-11 RX ADMIN — CALCIUM CARBONATE (ANTACID) CHEW TAB 500 MG 1000 MG: 500 CHEW TAB at 18:55

## 2024-11-11 ASSESSMENT — ACTIVITIES OF DAILY LIVING (ADL)
ADLS_ACUITY_SCORE: 0

## 2024-11-11 NOTE — ANESTHESIA PREPROCEDURE EVALUATION
"Anesthesia Pre-Procedure Evaluation    Patient: Kaden Ibarra   MRN: [id number] : 2000        Procedure :           No past medical history on file.   Past Surgical History:   Procedure Laterality Date    ENT SURGERY        No Known Allergies   Social History     Tobacco Use    Smoking status: Never    Smokeless tobacco: Never   Substance Use Topics    Alcohol use: Never      Wt Readings from Last 1 Encounters:   10/06/24 73 kg (161 lb)        Anesthesia Evaluation   Pt has not had prior anesthetic         ROS/MED HX  ENT/Pulmonary:  - neg pulmonary ROS     Neurologic:  - neg neurologic ROS     Cardiovascular:  - neg cardiovascular ROS     METS/Exercise Tolerance:     Hematologic:  - neg hematologic  ROS     Musculoskeletal:       GI/Hepatic:  - neg GI/hepatic ROS     Renal/Genitourinary:       Endo:  - neg endo ROS     Psychiatric/Substance Use:  - neg psychiatric ROS     Infectious Disease:       Malignancy:       Other:            Physical Exam    Airway        Mallampati: II   TM distance: > 3 FB   Neck ROM: full   Mouth opening: > 3 cm    Respiratory Devices and Support         Dental  no notable dental history         Cardiovascular   cardiovascular exam normal          Pulmonary   pulmonary exam normal                OUTSIDE LABS:  CBC:   Lab Results   Component Value Date    WBC 12.6 (H) 2024    HGB 14.3 2024    HGB 12.5 2024    HCT 39.6 2024     2024     BMP:   Lab Results   Component Value Date     (L) 2024    POTASSIUM 4.5 2024    CHLORIDE 100 2024    CO2 18 (L) 2024    BUN 6.2 2024    CR 0.50 (L) 2024    GLC 86 2024     COAGS: No results found for: \"PTT\", \"INR\", \"FIBR\"  POC:   Lab Results   Component Value Date    HCGS Positive (A) 2024     HEPATIC: No results found for: \"ALBUMIN\", \"PROTTOTAL\", \"ALT\", \"AST\", \"GGT\", \"ALKPHOS\", \"BILITOTAL\", \"BILIDIRECT\", \"DAPHNIE\"  OTHER:   Lab Results   Component Value " Date    MIGUEL 8.9 03/17/2024       Anesthesia Plan    ASA Status:  2       Anesthesia Type: Epidural.              Consents    Anesthesia Plan(s) and associated risks, benefits, and realistic alternatives discussed. Questions answered and patient/representative(s) expressed understanding.     - Discussed:     - Discussed with:  Patient            Postoperative Care            Comments:           neg OB ROS.      Beck Rosales MD    I have reviewed the pertinent notes and labs in the chart from the past 30 days and (re)examined the patient.  Any updates or changes from those notes are reflected in this note.

## 2024-11-11 NOTE — H&P
[facility]     History and Physical  Obstetrics and Gynecology     Date of Admission:  2024    History of Present Illness   Kaden Ibarra is a 23 year old female  39w3d with Estimated Date of Delivery: Nov 15, 2024 who presents with SOL. Lizy VB, LOF, decr FM.     PRENATAL COURSE  Prenatal care was received at [facility] and the  course was complicated by: [id number], GBS+, Hx Cleft Pallate, Hx KWAME, Hx Dep (HB following).     Prenatal Care:  - OB labs reviewed: A, Rubella immune, Heb B immune  - Genetics: AFP low risk.   - Anatomy: L2  unremarkable, posterior placenta  - Rh positive, Rhogam not indicated  - GCT, CBC, RPR wnl  - Declined Tdap  - COVID vaccine: s/p 2 doses  - Flu and COVID declined   - GBS POS  - Contraception: Declines;  in [place]  - Feed: Breast, Rx given previously  - Peds: Unsure     Recent Labs   Lab Test 24  1420   AS Negative     Rhogam not indicated   No lab results found.    Past Medical History    I have reviewed this patient's medical history and updated it with pertinent information if needed.   History reviewed. No pertinent past medical history.    Past Surgical History   I have reviewed this patient's surgical history and updated it with pertinent information if needed.  Past Surgical History:   Procedure Laterality Date    ENT SURGERY         Prior to Admission Medications   Prior to Admission Medications   Prescriptions Last Dose Informant Patient Reported? Taking?   Prenatal Vit-Fe Fumarate-FA (PRENATAL MULTIVITAMIN  PLUS IRON) 27-1 MG TABS   Yes No   Sig: Take 1 tablet by mouth daily.      Facility-Administered Medications: None     Allergies   No Known Allergies    Social History   I have reviewed this patient's social history and updated it with pertinent information if needed. Kaden Ibarra  reports that she has never smoked. She has never used smokeless tobacco. She reports that she does  not drink alcohol and does not use drugs.    Family History   I have reviewed this patient's family history and updated it with pertinent information if needed.   History reviewed. No pertinent family history.    Immunization History   As above.     Physical Exam   Temp: 98  F (36.7  C) Temp src: Oral BP: 110/66 Pulse: 89   Resp: 22        Vital Signs with Ranges  Temp:  [97.6  F (36.4  C)-98  F (36.7  C)] 98  F (36.7  C)  Pulse:  [89] 89  Resp:  [15-22] 22  BP: (104-145)/() 110/66  Fetal Heart Tones: 140 baseline, moderate variablility, + accels, no decels, and Category I  TOCO: frequency q 3-5 minutes    Constitutional: healthy, alert, and active   Respiratory: non-labored  Cardiovascular: RR  Abdomen: gravid, single vertex fetus, non-tender, EFW 7 lbs   Cervical Exam: 5cm and BBOW per RN recently  Skin/Extremites: normal skin color, texture, turgor  Neurologic: A&O  Neuropsychiatric: General: normal, calm, and normal eye contact    24 US: Vertex and Placenta Posterior  10/18/24 BSUS: Cephalic    Assessment & Plan   Kaden Ibarra is a 23 year old female  who presents at 39w3d with SOL.   GBS positive.   NST reactive.  Category  I.     Admit - see IP orders  - Admit labs  - Epidural   - PCN   - SW consult pp    Stevie Dean MD

## 2024-11-11 NOTE — PROVIDER NOTIFICATION
11/11/24 1400   Provider Notification   Provider Name/Title Dr Dean   Method of Notification Phone   Comments   Nursing Comments Dr Dean updated on cervical change. SVE 3.5/90/-2 station. Intrapartum orders received.

## 2024-11-11 NOTE — ANESTHESIA PROCEDURE NOTES
Epidural catheter Procedure Note    Pre-Procedure   Staff -        Anesthesiologist:  Beck Rosales MD       Performed By: anesthesiologist  Procedure Documentation  Procedure: epidural catheter   Comments:  Pre-Procedure  Performed by Beck Rosales MD  Location: OB.      PreAnesthestic Checklist: patient identified, IV checked, risks and benefits discussed, informed consent obtained, monitors and equipment checked, pre-op evaluation and at physician/surgeon's request.    Timeout   Correct Patient: Yes  Correct Procedure: Epidural catheter placement  Correct Site: Yes   Correct Position: Yes    Procedure Documentation  Procedure:   Epidural catheter block for Labor    Patient currently in labor and she and OBMD request a labor epidural to control her labor pains. Patient was interviewed and examined. Procedure and risks including but not limited to bleeding, infection, nerve injury, paralysis, PDPH, and inadequate block requiring intervention discussed with patient. Questions answered. This epidural is to be placed in anticipation of vaginal delivery.  She consents to the epidural procedure.  Time-out was performed.  I or my partners remain immediately available for management of any issues or complications and will monitor at appropriate intervals.  Procedure: Patient sitting. Betadine prep x 3. Sterile drape applied.  Lidocaine 1%  local infiltration at L 3-4.  17 G. Tuohy needle at L3-4 by loss of resistance into epidural space.  No CSF, paresthesia or blood. 1.5 % Lidocaine with 1:200,000 Epinephrine 5cc test dose. Then 0.25% bupivicaine 10 cc with NS 5 cc.  Epidural catheter inserted w/o resistance to 5 cm in epidural space.  Aspiration negative for blood and CSF.   Negative for neuro change, paresthesia or symptoms of intravascular injection or intrathecal injection.  Infusion orders written and infusion of 0.125% bupivicaine 15cc per hour started.    Beck Rosales MD               FOR [facility] ([facility]  "[facility]) ONLY:   Pain Team Contact information: please page the Pain Team Via [facility]. Search \"Pain\". During daytime hours, please page the attending first. At night please page the resident first.      "

## 2024-11-11 NOTE — PROVIDER NOTIFICATION
11/11/24 1300   Comments   Nursing Comments Updated on patient arrival, SVE 3/80/-3 station. Rates her pain at 10 out of 10. Requests an epidural. Orders received to recheck SVE in 1 hour and give Fentanyl for pain management at this time. Patient agrees with plan.

## 2024-11-12 LAB — HGB BLD-MCNC: 11.2 G/DL (ref 11.7–15.7)

## 2024-11-12 PROCEDURE — 36415 COLL VENOUS BLD VENIPUNCTURE: CPT | Performed by: OBSTETRICS & GYNECOLOGY

## 2024-11-12 PROCEDURE — 120N000013 HC R&B IMCU

## 2024-11-12 PROCEDURE — 250N000013 HC RX MED GY IP 250 OP 250 PS 637: Performed by: OBSTETRICS & GYNECOLOGY

## 2024-11-12 PROCEDURE — 85018 HEMOGLOBIN: CPT | Performed by: OBSTETRICS & GYNECOLOGY

## 2024-11-12 RX ORDER — DOCUSATE SODIUM 100 MG/1
100 CAPSULE, LIQUID FILLED ORAL 2 TIMES DAILY PRN
Qty: 30 CAPSULE | Refills: 1 | Status: SHIPPED | OUTPATIENT
Start: 2024-11-12

## 2024-11-12 RX ORDER — IBUPROFEN 800 MG/1
800 TABLET, FILM COATED ORAL EVERY 6 HOURS PRN
Qty: 60 TABLET | Refills: 1 | Status: SHIPPED | OUTPATIENT
Start: 2024-11-12

## 2024-11-12 RX ORDER — ACETAMINOPHEN 325 MG/1
650 TABLET ORAL EVERY 4 HOURS PRN
Qty: 60 TABLET | Refills: 1 | Status: SHIPPED | OUTPATIENT
Start: 2024-11-12

## 2024-11-12 RX ADMIN — ACETAMINOPHEN 325MG 650 MG: 325 TABLET ORAL at 18:49

## 2024-11-12 RX ADMIN — CALCIUM CARBONATE (ANTACID) CHEW TAB 500 MG 1000 MG: 500 CHEW TAB at 06:12

## 2024-11-12 RX ADMIN — DOCUSATE SODIUM 100 MG: 100 CAPSULE, LIQUID FILLED ORAL at 10:52

## 2024-11-12 RX ADMIN — BENZOCAINE: 11.4 AEROSOL, SPRAY TOPICAL at 04:08

## 2024-11-12 RX ADMIN — IBUPROFEN 800 MG: 800 TABLET, FILM COATED ORAL at 23:42

## 2024-11-12 RX ADMIN — IBUPROFEN 800 MG: 800 TABLET, FILM COATED ORAL at 03:14

## 2024-11-12 RX ADMIN — ACETAMINOPHEN 325MG 650 MG: 325 TABLET ORAL at 02:04

## 2024-11-12 RX ADMIN — IBUPROFEN 800 MG: 800 TABLET, FILM COATED ORAL at 10:53

## 2024-11-12 RX ADMIN — ACETAMINOPHEN 325MG 650 MG: 325 TABLET ORAL at 06:12

## 2024-11-12 NOTE — DISCHARGE SUMMARY
[facility]    Discharge Summary  Obstetrics    Date of Admission:  2024  Date of Discharge:  2024  Discharging Provider: Maria D Graves MD      Discharge Diagnoses   -  at 39 wks GA  - Hx Depression (no meds)    History of Present Illness   Kaden Ibarra is a 23 year old female now  who presented to L&D @ 39w3d SOL. Her pregnancy has been complicated by  [id number], GBS+, Hx Cleft Pallate, Hx KWAME, Hx Dep (HB following). Please see her admit H&P for full details of her PMH, PSH, Meds, Allergies and exam on admit.    Hospital Course   The patient had a Normal spontaneous vaginal delivery @ 39w3d, please see her delivery summary for full details. She delivered with <30 minutes of pushing. Baby girl. QBL 547mL for atony and complex laceration.     Her postpartum course was uncomplicated. On postpartum day 2, she was meeting all of her postpartum goals and deemed stable for discharge. She was voiding without difficulty, tolerating a regular diet without nausea and vomiting, her pain was well controlled on oral pain medicines and her lochia was appropriate.    Hgb:   Lab Results   Component Value Date    HGB 14.3 2024    HGB 12.5 2024       Rh+ and rhogam was not given    Contraception was discussed and will be addressed at her postpartum appointment.    Instructions:  1) Call for temperature greater than 100.4F, foul smelling vaginal discharge, bleeding more than 1 pad per hour for 2 hrs, pain not controlled by oral pain meds, severe constipation or severe nausea or vomiting.  2) She was instructed to follow-up with her primary OB in 6 weeks for a routine postpartum visit  3) She was instructed to continue her PNV on discharge if she wished to breast feed her infant.    Maria D Graves MD on 2024 at 9:28 AM      Discharge Disposition   Discharged to home   Condition at discharge: Stable    Primary Care Physician   [facility]    Consultations This Hospital Stay   ANESTHESIOLOGY IP CONSULT  LACTATION IP CONSULT    Discharge Orders      Breast pump - Manual/Electric    Breast Pump Documentation:  Manual/Electric Pump: To support adequate breast milk production and nutrition for infant.     I, the undersigned, certify that the above prescribed supplies are medically necessary for this patient and is both reasonable and necessary in reference to accepted standards of medical and necessary in reference to accepted standards of medical practice in the treatment of this patient's condition and is not prescribed as a convenience.     Discharge Medications   Current Discharge Medication List        CONTINUE these medications which have NOT CHANGED    Details   Prenatal Vit-Fe Fumarate-FA (PRENATAL MULTIVITAMIN  PLUS IRON) 27-1 MG TABS Take 1 tablet by mouth daily.           Allergies   No Known Allergies

## 2024-11-12 NOTE — L&D DELIVERY NOTE
VAGINAL DELIVERY NOTE  Patient: Kaden Ibarra  MRN: [id number]  : 2000  Delivery Date: 24  Delivering Time: 20:40  Delivering Provider: Stevie Dean MD      Patient presented 22y/o G1 at 39.3wks GA with SOL.   Her pregnancy was complicated by GBS+, Hx Dep (no meds), Personal Hx of cleft palate, Hx KWAME, Hx BMI 25.  During her admission epidural, AROM clear fluid.    She pushed effectively for under 30 minutes.    She delivered a female .  She pushed effectively and head delivered in J maneuver, nuchal x2 visualized and reduced, then head followed by anterior and posterior shoulder and the rest of the body followed atraumatically.  The baby transitioned well and was placed on mother, skin-to-skin.   Delayed cord clamp, cut by patient's sister, with 3VC.  Placenta delivered with active management intact.  Uterus massaged and IV pitocin running, firm and at umbilicus.  Perineum inspected and 2nd degree laceration identified as well as bleeding from clitoral laceration and bilateral sulcal tears. After identifying the urethra, 3-0 monocryl simple interrupted x2 reapproximated tissue and stopped bleeding. Fundal pressure showed urine leak from urethra. The left sulcal bleeder was stopped with figure of 8 with 3-0 monocryl. Placenta was checked. Cervix was inspected and not bleeding. Bimanual massage firmed uterus along with wide open pitocin. Right suclcal bleeder visualized and closed similarly to left. Hemostasis achieved. 2nd degree repaired in standard fashion. Rectal ppx 800mcg cytotec placed, sphincter intact and no suture palpable.   Upon leaving room, good hemostasis with mom and  in stable condition.  Sponge and needle counts were correct x2.

## 2024-11-12 NOTE — PROGRESS NOTES
[facility]  Labor Progress Note    S: Patient comfortable and resting in bed after epidural. Agrees to attempt at AROM. Discussed r/b/a.     O:   Patient Vitals for the past 4 hrs:   BP Temp Temp src Resp SpO2   11/11/24 1925 -- -- -- -- 100 %   11/11/24 1920 139/69 -- -- -- 96 %   11/11/24 1909 115/81 -- -- -- --   11/11/24 1905 -- -- -- -- 94 %   11/11/24 1904 110/79 -- -- -- --   11/11/24 1900 123/68 -- -- -- 94 %   11/11/24 1855 105/71 -- -- -- 94 %   11/11/24 1851 122/89 -- -- -- 94 %   11/11/24 1850 -- -- -- -- 94 %   11/11/24 1845 -- -- -- -- 94 %   11/11/24 1844 126/70 97.9  F (36.6  C) Oral -- --   11/11/24 1840 -- -- -- -- 93 %   11/11/24 1837 -- -- -- -- (!) 88 %   11/11/24 1835 127/68 -- -- -- 96 %   11/11/24 1832 -- -- -- -- 93 %   11/11/24 1830 -- -- -- -- 91 %   11/11/24 1827 -- -- -- -- 94 %   11/11/24 1825 127/83 -- -- -- 96 %   11/11/24 1820 113/60 -- -- -- 93 %   11/11/24 1815 -- -- -- -- 92 %   11/11/24 1814 -- -- -- -- 92 %   11/11/24 1810 -- -- -- -- 97 %   11/11/24 1807 -- -- -- -- 93 %   11/11/24 1805 -- -- -- -- 98 %   11/11/24 1800 106/55 -- -- -- 99 %   11/11/24 1755 -- -- -- -- 100 %   11/11/24 1752 -- 97.7  F (36.5  C) Oral 18 --   11/11/24 1750 -- -- -- -- 99 %   11/11/24 1745 -- -- -- -- 100 %   11/11/24 1740 -- -- -- -- 100 %   11/11/24 1735 -- -- -- -- 100 %   11/11/24 1729 110/66 -- -- -- --   11/11/24 1724 104/60 -- -- -- --   11/11/24 1719 107/61 -- -- -- --   11/11/24 1709 134/75 -- -- -- --   11/11/24 1704 (!) 138/93 -- -- -- --   11/11/24 1654 (!) 137/92 -- -- -- --   11/11/24 1644 135/87 -- -- -- --   11/11/24 1638 (!) 145/109 -- -- -- --   11/11/24 1635 134/85 -- -- -- --   11/11/24 1633 126/80 -- -- -- --   11/11/24 1632 -- 98  F (36.7  C) Oral 22 --   11/11/24 1623 129/63 -- -- -- --   11/11/24 1613 124/57 -- -- -- --   11/11/24 1609 135/72 -- -- -- --   11/11/24 1602 (!) 141/66 -- -- -- --   11/11/24 1600 122/65 -- -- -- --   11/11/24 1558 126/69 -- -- -- --    24 1556 118/67 -- -- -- --   24 1554 115/66 -- -- -- --   24 1552 118/71 -- -- -- --   24 1550 123/63 -- -- -- --   24 1542 136/75 -- -- -- --       SVE: 7-8/90%/-1, BBOW, AROM performed, clear fluid, head well applied.    FHT: 120/mod/+a with scalp stim/-d  [place]: Ctx q2-5min    A/P:  Ms. Kaden Ibarra is a 23 year old  at 39w3d, here for SOL.    Labor:   - S/p Epidural  - S/p AROM 19:15  - Anticipate      FWB:   - Category 1 FHT  - S/p x2 doses PCN    PNC:   - Rh +, Rubella immune, GBS +, GCT passed, Placenta posterior    Other:   - Declines pro , bilingual.  - BP elevated with pain, sitting for epidural, since wnl; will continue to monitor, unlikely gHTN/pre-e.

## 2024-11-12 NOTE — PLAN OF CARE
Vital signs stable. Postpartum checks WDL. Pt is up ad elvira, voiding w/o difficulties. Pt is independent in self cares. Pt is Breast feeding every 2-3 hrs. Demonstrated hand expression - has lots of colostrum. Requiring full assistance with feeds. Praise and encouragement given. Pain well controlled with Tylenol and Ibuprofen. Pt stated increased comfort after each medication. Pt is attentive to all  cues and cares. Positive bonding and frequent holding observed. Sister and friend at bedside, supportive of pt.              Goal Outcome Evaluation:      Plan of Care Reviewed With: patient    Overall Patient Progress: improvingOverall Patient Progress: improving      Problem: Adult Inpatient Plan of Care  Goal: Plan of Care Review  Description: The Plan of Care Review/Shift note should be completed every shift.  The Outcome Evaluation is a brief statement about your assessment that the patient is improving, declining, or no change.  This information will be displayed automatically on your shift  note.  Outcome: Progressing  Flowsheets (Taken 2024 0155)  Plan of Care Reviewed With: patient  Overall Patient Progress: improving  Goal: Absence of Hospital-Acquired Illness or Injury  Intervention: Prevent Skin Injury  Recent Flowsheet Documentation  Taken 2024 by Maira Wells, RN  Body Position: position changed independently  Intervention: Prevent Infection  Recent Flowsheet Documentation  Taken 2024 by Maira Wells, RN  Infection Prevention:   hand hygiene promoted   rest/sleep promoted   single patient room provided  Goal: Optimal Comfort and Wellbeing  Intervention: Provide Person-Centered Care  Recent Flowsheet Documentation  Taken 2024 by Maira Wells, RN  Trust Relationship/Rapport:   care explained   choices provided   emotional support provided   empathic listening provided   questions answered   questions encouraged   reassurance provided   thoughts/feelings  acknowledged     Problem: Labor  Goal: Stable Fetal Wellbeing  Intervention: Promote and Monitor Fetal Wellbeing  Recent Flowsheet Documentation  Taken 11/11/2024 2353 by Maira Wells RN  Body Position: position changed independently  Goal: Absence of Infection Signs and Symptoms  Intervention: Prevent or Manage Infection  Recent Flowsheet Documentation  Taken 11/11/2024 2353 by Maira Wells RN  Infection Prevention:   hand hygiene promoted   rest/sleep promoted   single patient room provided     Problem: Postpartum (Vaginal Delivery)  Goal: Optimal Pain Control and Function  Intervention: Prevent or Manage Pain  Recent Flowsheet Documentation  Taken 11/11/2024 2353 by Maira Wells, RN  Perineal Care:   perineal hygiene encouraged   perineal spray bottle/warm water use encouraged

## 2024-11-12 NOTE — PROGRESS NOTES
[facility]   Post-partum Note    Name:  Kaden Ibarra  MRN: [id number]    S: Patient states she is doing quite well this AM, pleased with how quickly things went.  Feeling sore and nervous about her laceration - how long is it going to take? Pain is otherwise controlled.  Tolerating regular diet without nausea or vomiting. Voiding spontaneously, passing flatus but no bowel movement as of yet.  Ambulating without dizziness.  Lochia similar to heavy menses.  Breast feeding - is colostrum enough, when does her milk come in? Plans discharge tomorrow.    O:   Patient Vitals for the past 24 hrs:   BP Temp Temp src Pulse Resp SpO2   11/12/24 0612 116/67 97.9  F (36.6  C) Oral 82 18 --   11/12/24 0252 112/63 97.8  F (36.6  C) Oral 80 18 --   11/11/24 2353 125/71 98  F (36.7  C) Oral 81 18 --   11/11/24 2255 117/75 -- -- -- 18 --   11/11/24 2241 113/58 -- -- -- 18 --   11/11/24 2225 120/77 -- -- -- 18 --   11/11/24 2210 126/78 -- -- -- 18 --   11/11/24 2150 131/58 -- -- -- 18 --   11/11/24 2140 121/67 -- -- -- -- --   11/11/24 2130 129/72 -- -- -- -- --   11/11/24 2100 (!) 143/67 -- -- -- -- --   11/11/24 2046 -- 98.1  F (36.7  C) Oral -- -- --   11/11/24 2035 -- -- -- -- -- 95 %   11/11/24 2030 (!) 159/65 -- -- -- -- 96 %   11/11/24 2028 -- -- -- -- -- 94 %   11/11/24 2025 -- -- -- -- -- 95 %   11/11/24 2023 -- -- -- -- -- (!) 83 %   11/11/24 2020 -- -- -- -- -- 95 %   11/11/24 2017 -- -- -- -- -- 93 %   11/11/24 2015 -- 98.4  F (36.9  C) Oral -- 18 100 %   11/11/24 2010 -- -- -- -- -- 100 %   11/11/24 2005 -- -- -- -- -- 100 %   11/11/24 2000 107/62 -- -- -- -- 99 %   11/11/24 1950 -- -- -- -- -- 99 %   11/11/24 1940 -- -- -- -- -- 100 %   11/11/24 1930 -- -- -- -- -- 100 %   11/11/24 1925 -- -- -- -- -- 100 %   11/11/24 1920 139/69 -- -- -- -- 96 %   11/11/24 1910 -- -- -- -- -- 93 %   11/11/24 1909 115/81 -- -- -- -- --   11/11/24 1905 -- -- -- -- -- 94 %   11/11/24 1904 110/79 -- -- -- -- --   11/11/24  1900 123/68 -- -- -- -- 94 %   11/11/24 1855 105/71 -- -- -- -- 94 %   11/11/24 1851 122/89 -- -- -- -- 94 %   11/11/24 1850 -- -- -- -- -- 94 %   11/11/24 1845 -- -- -- -- -- 94 %   11/11/24 1844 126/70 97.9  F (36.6  C) Oral -- -- --   11/11/24 1840 -- -- -- -- -- 93 %   11/11/24 1837 -- -- -- -- -- (!) 88 %   11/11/24 1835 127/68 -- -- -- -- 96 %   11/11/24 1832 -- -- -- -- -- 93 %   11/11/24 1830 -- -- -- -- -- 91 %   11/11/24 1827 -- -- -- -- -- 94 %   11/11/24 1825 127/83 -- -- -- -- 96 %   11/11/24 1820 113/60 -- -- -- -- 93 %   11/11/24 1815 -- -- -- -- -- 92 %   11/11/24 1814 -- -- -- -- -- 92 %   11/11/24 1810 -- -- -- -- -- 97 %   11/11/24 1807 -- -- -- -- -- 93 %   11/11/24 1805 -- -- -- -- -- 98 %   11/11/24 1800 106/55 -- -- -- -- 99 %   11/11/24 1755 -- -- -- -- -- 100 %   11/11/24 1752 -- 97.7  F (36.5  C) Oral -- 18 --   11/11/24 1750 -- -- -- -- -- 99 %   11/11/24 1745 -- -- -- -- -- 100 %   11/11/24 1740 -- -- -- -- -- 100 %   11/11/24 1735 -- -- -- -- -- 100 %   11/11/24 1729 110/66 -- -- -- -- --   11/11/24 1724 104/60 -- -- -- -- --   11/11/24 1719 107/61 -- -- -- -- --   11/11/24 1709 134/75 -- -- -- -- --   11/11/24 1704 (!) 138/93 -- -- -- -- --   11/11/24 1654 (!) 137/92 -- -- -- -- --   11/11/24 1644 135/87 -- -- -- -- --   11/11/24 1638 (!) 145/109 -- -- -- -- --   11/11/24 1635 134/85 -- -- -- -- --   11/11/24 1633 126/80 -- -- -- -- --   11/11/24 1632 -- 98  F (36.7  C) Oral -- 22 --   11/11/24 1623 129/63 -- -- -- -- --   11/11/24 1613 124/57 -- -- -- -- --   11/11/24 1609 135/72 -- -- -- -- --   11/11/24 1602 (!) 141/66 -- -- -- -- --   11/11/24 1600 122/65 -- -- -- -- --   11/11/24 1558 126/69 -- -- -- -- --   11/11/24 1556 118/67 -- -- -- -- --   11/11/24 1554 115/66 -- -- -- -- --   11/11/24 1552 118/71 -- -- -- -- --   11/11/24 1550 123/63 -- -- -- -- --   11/11/24 1542 136/75 -- -- -- -- --   11/11/24 1430 126/81 (P) 97.6  F (36.4  C) (P) Oral -- 18 --   11/11/24 1230 120/83 97.9   F (36.6  C) Oral 89 15 --     Gen:  Resting comfortably, NAD  CV:  Regular rate  Pulm:  Non-labored breathing.  No cough or wheezing.   Abd:  Soft, appropriately tender to palpation, non-distended.  Fundus at umbilicus, firm and non-tender.  Ext:  Non-tender, 1+ LE edema b/l    Hgb:   Recent Labs   Lab Test 24  0616   HGB 11.2*       Assessment/Plan:  23 year old  on PPD #1 s/p  c/b complex 2nd degree perineal laceration.  Continue with routine postpartum management.     Pain: Well-controlled with ibuprofen and tylenol  Hgb: 14.3>EBL 547cc> 11.2. VSS as noted above, asymptomatic.   GI:  BID Senna/Colace.  PRN Simethicone.   PPx:  Encouraged ambulation   Rh: Positive  Rubella: Immune  Feed: Breast, lactation today  BC: Has declined     Depression:   - Elevated EPDS in clinic, HB following   - SW consult inpatient today  - 2 week PP mood check     Dispo: Plan for home on PPD#2.     Mariama Issa MD   Pager: [phone]   2024

## 2024-11-12 NOTE — PROGRESS NOTES
Data: Kaden Ibarra transferred to [facility] via wheelchair at 2345. Baby transferred via parent's arms.    Action: Receiving unit notified of transfer: Yes. Patient and family notified of room change. Report given to Maira GABRIEL at 0000. Belongings sent to receiving unit. Accompanied by Registered Nurse. Oriented patient to surroundings. Call light within reach. ID bands double-checked with receiving RN.    Response: Patient tolerated transfer and is stable.

## 2024-11-12 NOTE — PLAN OF CARE
"VSS, pain controlled with pharmacological and non-pharmacological interventions due to lacerations (see delivery summery); assisted with latch and positioning, and hand expression, set up pumping as well, pt only started for a few minutes but will \"resume later\" per pt; scored \"0\" on PPD, seen by SW and again updated SW on PPD score by this writer (pt scored 17 on PPD in pregnancy), explained papers on the clip board, continue to monitor.  Problem: Adult Inpatient Plan of Care  Goal: Plan of Care Review  Description: The Plan of Care Review/Shift note should be completed every shift.  The Outcome Evaluation is a brief statement about your assessment that the patient is improving, declining, or no change.  This information will be displayed automatically on your shift  note.  Outcome: Progressing  Flowsheets (Taken 11/12/2024 1441)  Plan of Care Reviewed With: patient  Goal: Patient-Specific Goal (Individualized)  Description: You can add care plan individualizations to a care plan. Examples of Individualization might be:  \"Parent requests to be called daily at 9am for status\", \"I have a hard time hearing out of my right ear\", or \"Do not touch me to wake me up as it startles  me\".  Outcome: Progressing  Goal: Absence of Hospital-Acquired Illness or Injury  Outcome: Progressing  Goal: Optimal Comfort and Wellbeing  Outcome: Progressing  Intervention: Monitor Pain and Promote Comfort  Recent Flowsheet Documentation  Taken 11/12/2024 1053 by Ute Viveros, RN  Pain Management Interventions: medication (see MAR)  Goal: Readiness for Transition of Care  Outcome: Progressing     Problem: Postpartum (Vaginal Delivery)  Goal: Successful Parent Role Transition  Outcome: Progressing  Intervention: Support Parent Role Transition  Recent Flowsheet Documentation  Taken 11/12/2024 1245 by Ute Viveros, RN  Parent-Child Attachment Promotion:   rooming-in promoted   caring behavior modeled  Goal: " Hemostasis  Outcome: Progressing  Goal: Absence of Infection Signs and Symptoms  Outcome: Progressing  Goal: Anesthesia/Sedation Recovery  Outcome: Progressing  Goal: Optimal Pain Control and Function  Outcome: Progressing  Intervention: Prevent or Manage Pain  Recent Flowsheet Documentation  Taken 11/12/2024 1053 by Ute Viveros, RN  Pain Management Interventions: medication (see MAR)  Goal: Effective Urinary Elimination  Outcome: Progressing   Goal Outcome Evaluation:      Plan of Care Reviewed With: patient

## 2024-11-12 NOTE — PROVIDER NOTIFICATION
11/11/24 1814   Provider Notification   Provider Name/Title MD VALENCIA   Method of Notification Electronic Page     RN notified MD via electronic page that SVE after epidural is 7.5/90 with a bulging bag. Starting next dose of penicillin at 1830.

## 2024-11-12 NOTE — DISCHARGE INSTRUCTIONS
Warning Signs after Having a Baby    Keep this paper on your fridge or somewhere else where you can see it.    Call your provider if you have any of these symptoms up to 12 weeks after having your baby.    Thoughts of hurting yourself or your baby  Pain in your chest or trouble breathing  Severe headache not helped by pain medicine  Eyesight concerns (blurry vision, seeing spots or flashes of light, other changes to eyesight)  Fainting, shaking or other signs of a seizure    Call 9-1-1 if you feel that it is an emergency.     The symptoms below can happen to anyone after giving birth. They can be very serious. Call your provider if you have any of these warning signs.    My provider s phone number: _______________________    Losing too much blood (hemorrhage)    Call your provider if you soak through a pad in less than an hour or pass blood clots bigger than a golf ball. These may be signs that you are bleeding too much.    Blood clots in the legs or lungs    After you give birth, your body naturally clots its blood to help prevent blood loss. Sometimes this increased clotting can happen in other areas of the body, like the legs or lungs. This can block your blood flow and be very dangerous.     Call your provider if you:  Have a red, swollen spot on the back of your leg that is warm or painful when you touch it.   Are coughing up blood.     Infection    Call your provider if you have any of these symptoms:  Fever of 100.4 F (38 C) or higher.  Pain or redness around your stitches if you had an incision.   Any yellow, white, or green fluid coming from places where you had stitches or surgery.    Mood Problems (postpartum depression)    Many people feel sad or have mood changes after having a baby. But for some people, these mood swings are worse.     Call your provider right away if you feel so anxious or nervous that you can't care for yourself or your baby.    Preeclampsia (high blood pressure)    Even if you  didn't have high blood pressure when you were pregnant, you are at risk for the high blood pressure disease called preeclampsia. This risk can last up to 12 weeks after giving birth.     Call your provider if you have:   Pain on your right side under your rib cage  Sudden swelling in the hands and face    Remember: You know your body. If something doesn't feel right, get medical help.     For informational purposes only. Not to replace the advice of your health care provider. Copyright 2020 [facility]. All rights reserved. Clinically reviewed by Ely Del Toro, RNC-OB, MSN. [employer] [id number] - Rev 02/23.

## 2024-11-12 NOTE — ANESTHESIA POSTPROCEDURE EVALUATION
Patient: Kaden Ibarra    Procedure: * No procedures listed *       Anesthesia Type:  Epidural    Note:  Disposition: Inpatient   Postop Pain Control:    PONV:    Neuro/Psych:    Airway/Respiratory:    CV/Hemodynamics:    Other NRE:    DID A NON-ROUTINE EVENT OCCUR? No    Event details/Postop Comments:  I or my partner was immediately available for management of this patient during epidural analgesia infusion.   Patient post labor epidural catheter, doing well.  She reports good pain relief with epidural catheter.  She denies ongoing sensorimotor block, headache, fever, chills or other complaints.  All questions answered, understanding voiced.  She will have us contacted for any questions or problems.           Last vitals:  Vitals:    11/12/24 0252 11/12/24 0612 11/12/24 0837   BP: 112/63 116/67 119/79   Pulse: 80 82 80   Resp: 18 18 20   Temp: 97.8  F (36.6  C) 97.9  F (36.6  C) 97.4  F (36.3  C)   SpO2:          Electronically Signed By: Ze Matthews MD  November 12, 2024  11:38 AM

## 2024-11-12 NOTE — PROVIDER NOTIFICATION
11/11/24 1911   Provider Notification   Provider Name/Title MD VALENCIA   Method of Notification At Bedside     MD at bedside reviewing plan of care. SVE 7.5/90/-1. AROM of clear fluid.

## 2024-11-12 NOTE — LACTATION NOTE
This note was copied from a baby's chart.  Lactation visit; this is Kaden's first baby and about 12 hours of age. Infant quiet alert but started gagging/spitting up in bassinet when writer to room- assisted with bulb syringe. Mother reports has been spitty. Education provided on first 24 hour feeding behavior and early feeding cues. Discussed benefits of STS and hand expression. Suggested STS right now and assisted with placing STS. Breast offered and reviewed latch techniques however infant sleeping and not opening mouth. Also reviewed hand expression techniques. Highly encouraged to keep STS for 20-30 minutes and continue with hand expression.   Reviewed offering breast with next cues.   All questions answered.

## 2024-11-12 NOTE — PROVIDER NOTIFICATION
11/11/24 2028   Provider Notification   Provider Name/Title MD VALENCIA   Method of Notification At Bedside       MD at bedside for evaluation of audible deceleration with broken tracing while pushing and to attend delivery. MD at bedside through delivery and repairs. Reviewed blood pressures, PT has had mild range blood pressures while shaking throughout her labor.

## 2024-11-12 NOTE — PROVIDER NOTIFICATION
11/11/24 1742   Provider Notification   Provider Name/Title MD VALENCIA   Method of Notification At Bedside       MD at bedside with PT and RN. Reviewing plan of care. Planning to check in again in a few hours and consider AROM after next dose of Penicillin.

## 2024-11-12 NOTE — CONSULTS
D) SWS responding to automatic referral per physician request r/t elevated EPDS score during pregnancy  I) SWS met with MOB, Kaden & two of her sister's who appear supportive & involved. Kaden declined having her sisters step out while SW visited & voiced she was comfortable with SW completing the assessment while family is present. They live together in a home with additional family members. This is Kaden's first child and she is prepared for baby at home and is on WIC. Kaden shared she has not utilized WIC benefits yet but does have a WIC card. SW reviewed the need to contact the [facility] to update her WIC benefits now that baby has arrived & Kaden confirmed she has [facility]'s contact information to do so. Kaden denied any prior mental health history or current mood concerns. SWS discussed baby blues/postpartum depression and gave information on this. SW provided Kaden with Depression and/or Anxiety During & After Pregnancy & discussed [place] support [place].   A) Kaden is A&O with good affect and eye contact. She is bonding well with baby & was smiling & talkative throughout SW's visit. Extended family is supportive & involved. Kaden discussed being ag[place]ín to live with her family & having a good support system with them.   P) No further d/c needs at this time. SWS available upon request.  Karla Johnson, BIBIANA  [facility]  11/12/2024  11:10 AM

## 2024-11-12 NOTE — PROVIDER NOTIFICATION
11/11/24 1708   Provider Notification   Provider Name/Title SARA TRUONG   Method of Notification At Bedside     MDA at bedside assessing PT pain and troubleshooting epidural. PT still reporting pain 10/10 after initial epidural placement.

## 2024-11-12 NOTE — PLAN OF CARE
Goal Outcome Evaluation:      Plan of Care Reviewed With: patient, significant other    Overall Patient Progress: improvingOverall Patient Progress: improving    Outcome Evaluation: stable vaginal delivery. Pumping.    Vital signs within normal limits. Postpartum checks within normal limits - see flow record. Patient eating and drinking normally. Patient able to empty bladder independently and is up ambulating. Patient performing self cares, is able to care for infant and is breast and bottle feeding every 2-3 hours. Adequate pain control noted by patient. Patient education done, see flow record. Positive attachment behaviors observed with infant. Patient's significant other present this shift. Continue current plan of care.  Anticipate discharge tomorrow.       Problem: Adult Inpatient Plan of Care  Goal: Plan of Care Review  Description: The Plan of Care Review/Shift note should be completed every shift.  The Outcome Evaluation is a brief statement about your assessment that the patient is improving, declining, or no change.  This information will be displayed automatically on your shift  note.  Recent Flowsheet Documentation  Taken 11/12/2024 1704 by Ro Brown RN  Outcome Evaluation: stable vaginal delivery. Pumping.  Plan of Care Reviewed With:   patient   significant other  Overall Patient Progress: improving  Goal: Absence of Hospital-Acquired Illness or Injury  Intervention: Prevent Skin Injury  Recent Flowsheet Documentation  Taken 11/12/2024 1655 by Ro Brown RN  Body Position: supine, head elevated  Intervention: Prevent Infection  Recent Flowsheet Documentation  Taken 11/12/2024 1655 by Ro Brown RN  Infection Prevention:   hand hygiene promoted   rest/sleep promoted  Goal: Optimal Comfort and Wellbeing  Intervention: Provide Person-Centered Care  Recent Flowsheet Documentation  Taken 11/12/2024 1655 by Ro Brown RN  Trust Relationship/Rapport:   care explained    "choices provided   emotional support provided   empathic listening provided   questions answered   questions encouraged   reassurance provided   thoughts/feelings acknowledged     Problem: Adult Inpatient Plan of Care  Goal: Plan of Care Review  Description: The Plan of Care Review/Shift note should be completed every shift.  The Outcome Evaluation is a brief statement about your assessment that the patient is improving, declining, or no change.  This information will be displayed automatically on your shift  note.  Outcome: Progressing  Flowsheets (Taken 11/12/2024 1704)  Outcome Evaluation: stable vaginal delivery. Pumping.  Plan of Care Reviewed With:   patient   significant other  Overall Patient Progress: improving  Goal: Patient-Specific Goal (Individualized)  Description: You can add care plan individualizations to a care plan. Examples of Individualization might be:  \"Parent requests to be called daily at 9am for status\", \"I have a hard time hearing out of my right ear\", or \"Do not touch me to wake me up as it startles  me\".  Outcome: Progressing  Goal: Absence of Hospital-Acquired Illness or Injury  Outcome: Progressing  Intervention: Prevent Skin Injury  Recent Flowsheet Documentation  Taken 11/12/2024 1655 by Ro Brown RN  Body Position: supine, head elevated  Intervention: Prevent Infection  Recent Flowsheet Documentation  Taken 11/12/2024 1655 by Ro Brown RN  Infection Prevention:   hand hygiene promoted   rest/sleep promoted  Goal: Optimal Comfort and Wellbeing  Outcome: Progressing  Intervention: Provide Person-Centered Care  Recent Flowsheet Documentation  Taken 11/12/2024 1655 by Ro Brown RN  Trust Relationship/Rapport:   care explained   choices provided   emotional support provided   empathic listening provided   questions answered   questions encouraged   reassurance provided   thoughts/feelings acknowledged  Goal: Readiness for Transition of Care  Outcome: " Progressing     Problem: Labor  Goal: Stable Fetal Wellbeing  Intervention: Promote and Monitor Fetal Wellbeing  Recent Flowsheet Documentation  Taken 11/12/2024 1655 by Ro Brown RN  Body Position: supine, head elevated  Goal: Absence of Infection Signs and Symptoms  Intervention: Prevent or Manage Infection  Recent Flowsheet Documentation  Taken 11/12/2024 1655 by Ro Brown RN  Infection Prevention:   hand hygiene promoted   rest/sleep promoted     Problem: Postpartum (Vaginal Delivery)  Goal: Successful Parent Role Transition  Outcome: Progressing  Goal: Hemostasis  Outcome: Progressing  Goal: Absence of Infection Signs and Symptoms  Outcome: Progressing  Goal: Anesthesia/Sedation Recovery  Outcome: Progressing  Goal: Optimal Pain Control and Function  Outcome: Progressing  Goal: Effective Urinary Elimination  Outcome: Progressing

## 2024-11-13 VITALS
HEART RATE: 74 BPM | BODY MASS INDEX: 26.14 KG/M2 | WEIGHT: 157.1 LBS | DIASTOLIC BLOOD PRESSURE: 78 MMHG | OXYGEN SATURATION: 95 % | RESPIRATION RATE: 16 BRPM | TEMPERATURE: 97.5 F | SYSTOLIC BLOOD PRESSURE: 121 MMHG

## 2024-11-13 PROCEDURE — 250N000013 HC RX MED GY IP 250 OP 250 PS 637: Performed by: OBSTETRICS & GYNECOLOGY

## 2024-11-13 RX ORDER — HYDROCORTISONE 25 MG/G
CREAM TOPICAL 3 TIMES DAILY PRN
Qty: 30 G | Refills: 0 | Status: SHIPPED | OUTPATIENT
Start: 2024-11-13

## 2024-11-13 RX ORDER — MODIFIED LANOLIN
OINTMENT (GRAM) TOPICAL
Qty: 7 G | Refills: 3 | Status: SHIPPED | OUTPATIENT
Start: 2024-11-13

## 2024-11-13 RX ADMIN — ACETAMINOPHEN 325MG 650 MG: 325 TABLET ORAL at 08:03

## 2024-11-13 RX ADMIN — IBUPROFEN 800 MG: 800 TABLET, FILM COATED ORAL at 05:35

## 2024-11-13 RX ADMIN — DOCUSATE SODIUM 100 MG: 100 CAPSULE, LIQUID FILLED ORAL at 08:03

## 2024-11-13 ASSESSMENT — ACTIVITIES OF DAILY LIVING (ADL)
ADLS_ACUITY_SCORE: 0
DEPENDENT_IADLS:: INDEPENDENT
ADLS_ACUITY_SCORE: 0

## 2024-11-13 NOTE — PROGRESS NOTES
"[facility] Postpartum Note    S:  Kaden Ibarra feels well this morning. Was able to sleep last night. Pain control adequate. Lochia minimal. Voiding. Breast and Formula feeding. Mood Good.     O:  Vitals were reviewed  Blood pressure 121/78, pulse 74, temperature 97.5  F (36.4  C), temperature source Oral, resp. rate 16, SpO2 95%, unknown if currently breastfeeding.    General: healthy, alert, and no distress  Abd: soft, appropriately tender, fundus firm  Legs: Non-tender, 1+ pitting edema    No results found for: \"RH\"  Rubella: immune    Assessment and Plan:   Postpartum Day #2, status post vaginal delivery, doing well.  -- Discharge home  -- F/U 6 weeks w/ Primary OB  -- Discharge meds: see med rec  -- Contraception: declines,  in [facility]      Pain:Well-controlled with ibuprofen and tylenol  Hgb:     14.3>EBL 547cc> 11.2. VSS as noted above, asymptomatic.   GI:       BID Senna/Colace.  PRN Simethicone.   PPx:     Encouraged ambulation   Rh:       Positive  Rubella: Immune  Feed:   Breast, lactation today  BC:      Has declined      Depression:   - Elevated EPDS in clinic, HB following   - SW consult inpatient today  - 2 week PP mood check     Maria D Graves MD  "

## 2024-11-13 NOTE — PLAN OF CARE
"Pt VSS. Postpartum checks WDL. Is bonding well with infant. Tolerating regular diet and able to ambulate independently. Urine output adequate, voids without difficulty. Pt utilizing ibuprofen for pain management. Pt is breastfeeding infant every 2-3 hours and supplementing with formula. Pt is also pumping and supplementing with her expressed breast milk.    Problem: Postpartum (Vaginal Delivery)  Goal: Successful Parent Role Transition  Outcome: Progressing  Goal: Hemostasis  Outcome: Progressing  Goal: Absence of Infection Signs and Symptoms  Outcome: Progressing  Goal: Anesthesia/Sedation Recovery  Outcome: Progressing  Goal: Optimal Pain Control and Function  Outcome: Progressing  Goal: Effective Urinary Elimination  Outcome: Progressing     Problem: Adult Inpatient Plan of Care  Goal: Plan of Care Review  Description: The Plan of Care Review/Shift note should be completed every shift.  The Outcome Evaluation is a brief statement about your assessment that the patient is improving, declining, or no change.  This information will be displayed automatically on your shift  note.  Outcome: Progressing  Flowsheets (Taken 11/13/2024 0602)  Plan of Care Reviewed With: patient  Overall Patient Progress: improving  Goal: Patient-Specific Goal (Individualized)  Description: You can add care plan individualizations to a care plan. Examples of Individualization might be:  \"Parent requests to be called daily at 9am for status\", \"I have a hard time hearing out of my right ear\", or \"Do not touch me to wake me up as it startles  me\".  Outcome: Progressing  Goal: Absence of Hospital-Acquired Illness or Injury  Outcome: Progressing  Intervention: Prevent Skin Injury  Recent Flowsheet Documentation  Taken 11/12/2024 2345 by Dedra Medrano RN  Body Position: position changed independently  Taken 11/12/2024 2123 by Dedra Medrano, RN  Body Position: position changed independently  Intervention: Prevent Infection  Recent Flowsheet " Documentation  Taken 11/12/2024 2345 by Dedra Medrano, RN  Infection Prevention:   hand hygiene promoted   rest/sleep promoted  Taken 11/12/2024 2123 by Dedra Medrano RN  Infection Prevention:   hand hygiene promoted   rest/sleep promoted  Goal: Optimal Comfort and Wellbeing  Outcome: Progressing  Intervention: Provide Person-Centered Care  Recent Flowsheet Documentation  Taken 11/12/2024 2345 by Dedra Medrano, RN  Trust Relationship/Rapport:   care explained   choices provided   questions answered   questions encouraged   thoughts/feelings acknowledged  Taken 11/12/2024 2123 by Dedra Medrano, RN  Trust Relationship/Rapport:   care explained   choices provided   questions answered   questions encouraged   thoughts/feelings acknowledged  Goal: Readiness for Transition of Care  Outcome: Progressing

## 2024-11-13 NOTE — PLAN OF CARE
"VSS on room air. Fundus/lochia WDL. Voiding appropriately and ambulating independently. Pain adequately controlled with PRN medications. Breast and formula feeding infant independently q2-3hr. Family at bedside, supportive. Positive bonding and attachment with infant observed.      Birth Certificate and PP depression screen received. Education completed and AVS/discharge paperwork reviewed.  Patient indicates understanding discharge instructions. Questions answered, concerns addressed, resources provided. Verbalizes when to return to clinic for follow up for herself and infant.  Prescriptions reviewed and delivered to patient.  Staff escorted patient and infant off unit with AVS/discharge paperwork, prescriptions, and personal belongings.     Problem: Postpartum (Vaginal Delivery)  Goal: Successful Parent Role Transition  Outcome: Met  Goal: Hemostasis  Outcome: Met  Goal: Absence of Infection Signs and Symptoms  Outcome: Met  Goal: Anesthesia/Sedation Recovery  Outcome: Met  Goal: Optimal Pain Control and Function  Outcome: Met  Intervention: Prevent or Manage Pain  Recent Flowsheet Documentation  Taken 11/13/2024 0803 by Shelbi Finney RN  Pain Management Interventions: medication (see MAR)  Goal: Effective Urinary Elimination  Outcome: Met     Problem: Adult Inpatient Plan of Care  Goal: Plan of Care Review  Description: The Plan of Care Review/Shift note should be completed every shift.  The Outcome Evaluation is a brief statement about your assessment that the patient is improving, declining, or no change.  This information will be displayed automatically on your shift  note.  Outcome: Met  Flowsheets (Taken 11/13/2024 0907)  Plan of Care Reviewed With: patient  Overall Patient Progress: improving  Goal: Patient-Specific Goal (Individualized)  Description: You can add care plan individualizations to a care plan. Examples of Individualization might be:  \"Parent requests to be called daily at 9am for status\", " "\"I have a hard time hearing out of my right ear\", or \"Do not touch me to wake me up as it startles  me\".  Outcome: Met  Goal: Absence of Hospital-Acquired Illness or Injury  Outcome: Met  Intervention: Prevent Skin Injury  Recent Flowsheet Documentation  Taken 11/13/2024 0800 by Shelbi Finney, RN  Body Position: position changed independently  Intervention: Prevent Infection  Recent Flowsheet Documentation  Taken 11/13/2024 0800 by Shelbi Finney, RN  Infection Prevention:   hand hygiene promoted   rest/sleep promoted  Goal: Optimal Comfort and Wellbeing  Outcome: Met  Intervention: Monitor Pain and Promote Comfort  Recent Flowsheet Documentation  Taken 11/13/2024 0803 by Shelbi Finney, RN  Pain Management Interventions: medication (see MAR)  Intervention: Provide Person-Centered Care  Recent Flowsheet Documentation  Taken 11/13/2024 0800 by Shelbi Finney, RN  Trust Relationship/Rapport:   care explained   choices provided   questions answered   questions encouraged   thoughts/feelings acknowledged   emotional support provided   empathic listening provided   reassurance provided  Goal: Readiness for Transition of Care  Outcome: Met   Goal Outcome Evaluation:      Plan of Care Reviewed With: patient    Overall Patient Progress: improvingOverall Patient Progress: improving           "